# Patient Record
Sex: MALE | Race: WHITE | NOT HISPANIC OR LATINO | Employment: FULL TIME | URBAN - METROPOLITAN AREA
[De-identification: names, ages, dates, MRNs, and addresses within clinical notes are randomized per-mention and may not be internally consistent; named-entity substitution may affect disease eponyms.]

---

## 2017-02-15 RX ORDER — ESCITALOPRAM OXALATE 20 MG/1
TABLET ORAL
Qty: 30 TABLET | Refills: 3 | Status: SHIPPED | OUTPATIENT
Start: 2017-02-15 | End: 2017-11-01 | Stop reason: SDUPTHER

## 2017-05-23 DIAGNOSIS — I10 ESSENTIAL HYPERTENSION: ICD-10-CM

## 2017-05-23 RX ORDER — AMLODIPINE BESYLATE 10 MG/1
TABLET ORAL
Qty: 30 TABLET | Refills: 1 | Status: SHIPPED | OUTPATIENT
Start: 2017-05-23 | End: 2017-07-22 | Stop reason: SDUPTHER

## 2017-05-23 RX ORDER — IRBESARTAN 300 MG/1
TABLET ORAL
Qty: 30 TABLET | Refills: 1 | Status: SHIPPED | OUTPATIENT
Start: 2017-05-23 | End: 2017-08-07 | Stop reason: SDUPTHER

## 2017-07-22 DIAGNOSIS — I10 ESSENTIAL HYPERTENSION: ICD-10-CM

## 2017-07-24 RX ORDER — AMLODIPINE BESYLATE 10 MG/1
TABLET ORAL
Qty: 30 TABLET | Refills: 1 | Status: SHIPPED | OUTPATIENT
Start: 2017-07-24 | End: 2017-10-02 | Stop reason: SDUPTHER

## 2017-08-07 DIAGNOSIS — I10 ESSENTIAL HYPERTENSION: ICD-10-CM

## 2017-08-08 RX ORDER — IRBESARTAN 300 MG/1
TABLET ORAL
Qty: 30 TABLET | Refills: 1 | Status: SHIPPED | OUTPATIENT
Start: 2017-08-08 | End: 2017-10-09 | Stop reason: SDUPTHER

## 2017-10-02 DIAGNOSIS — I10 ESSENTIAL HYPERTENSION: ICD-10-CM

## 2017-10-02 RX ORDER — AMLODIPINE BESYLATE 10 MG/1
TABLET ORAL
Qty: 30 TABLET | Refills: 1 | Status: SHIPPED | OUTPATIENT
Start: 2017-10-02 | End: 2017-11-01 | Stop reason: SDUPTHER

## 2017-10-09 DIAGNOSIS — I10 ESSENTIAL HYPERTENSION: ICD-10-CM

## 2017-10-09 RX ORDER — IRBESARTAN 300 MG/1
TABLET ORAL
Qty: 30 TABLET | Refills: 1 | Status: SHIPPED | OUTPATIENT
Start: 2017-10-09 | End: 2017-11-01 | Stop reason: SDUPTHER

## 2017-11-01 ENCOUNTER — OFFICE VISIT (OUTPATIENT)
Dept: FAMILY MEDICINE CLINIC | Facility: CLINIC | Age: 57
End: 2017-11-01

## 2017-11-01 VITALS
TEMPERATURE: 98.4 F | HEART RATE: 68 BPM | SYSTOLIC BLOOD PRESSURE: 128 MMHG | BODY MASS INDEX: 26.36 KG/M2 | WEIGHT: 234 LBS | RESPIRATION RATE: 16 BRPM | DIASTOLIC BLOOD PRESSURE: 70 MMHG

## 2017-11-01 DIAGNOSIS — Z23 IMMUNIZATION DUE: ICD-10-CM

## 2017-11-01 DIAGNOSIS — I10 ESSENTIAL HYPERTENSION: Primary | ICD-10-CM

## 2017-11-01 DIAGNOSIS — F41.9 ANXIETY: ICD-10-CM

## 2017-11-01 LAB
ANION GAP SERPL CALCULATED.3IONS-SCNC: 7 MMOL/L (ref 3–11)
BUN BLD-MCNC: 14 MG/DL (ref 9–23)
BUN/CREAT SERPL: 14 (ref 7–25)
CALCIUM SPEC-SCNC: 9.3 MG/DL (ref 8.7–10.4)
CHLORIDE SERPL-SCNC: 97 MMOL/L (ref 99–109)
CO2 SERPL-SCNC: 29 MMOL/L (ref 20–31)
CREAT BLD-MCNC: 1 MG/DL (ref 0.6–1.3)
GFR SERPL CREATININE-BSD FRML MDRD: 76 ML/MIN/1.73
GLUCOSE BLD-MCNC: 86 MG/DL (ref 70–100)
POTASSIUM BLD-SCNC: 4.4 MMOL/L (ref 3.5–5.5)
SODIUM BLD-SCNC: 133 MMOL/L (ref 132–146)

## 2017-11-01 PROCEDURE — 90471 IMMUNIZATION ADMIN: CPT | Performed by: FAMILY MEDICINE

## 2017-11-01 PROCEDURE — 99213 OFFICE O/P EST LOW 20 MIN: CPT | Performed by: FAMILY MEDICINE

## 2017-11-01 PROCEDURE — 80048 BASIC METABOLIC PNL TOTAL CA: CPT | Performed by: FAMILY MEDICINE

## 2017-11-01 PROCEDURE — 90715 TDAP VACCINE 7 YRS/> IM: CPT | Performed by: FAMILY MEDICINE

## 2017-11-01 PROCEDURE — 90686 IIV4 VACC NO PRSV 0.5 ML IM: CPT | Performed by: FAMILY MEDICINE

## 2017-11-01 PROCEDURE — 36415 COLL VENOUS BLD VENIPUNCTURE: CPT | Performed by: FAMILY MEDICINE

## 2017-11-01 PROCEDURE — 90472 IMMUNIZATION ADMIN EACH ADD: CPT | Performed by: FAMILY MEDICINE

## 2017-11-01 RX ORDER — AMLODIPINE BESYLATE 10 MG/1
10 TABLET ORAL DAILY
Qty: 30 TABLET | Refills: 6 | Status: SHIPPED | OUTPATIENT
Start: 2017-11-01 | End: 2018-06-26 | Stop reason: SDUPTHER

## 2017-11-01 RX ORDER — ESCITALOPRAM OXALATE 20 MG/1
20 TABLET ORAL DAILY
Qty: 30 TABLET | Refills: 6 | Status: SHIPPED | OUTPATIENT
Start: 2017-11-01 | End: 2018-09-01 | Stop reason: SDUPTHER

## 2017-11-01 RX ORDER — IRBESARTAN 300 MG/1
300 TABLET ORAL DAILY
Qty: 30 TABLET | Refills: 6 | Status: SHIPPED | OUTPATIENT
Start: 2017-11-01 | End: 2018-06-26 | Stop reason: SDUPTHER

## 2017-11-01 NOTE — PROGRESS NOTES
Subjective   Ramy Wan is a 57 y.o. male.     History of Present Illness   Needs medication renewal. No swelling or dizzy moments.  No chest discomfort. Still enjoys working. Exercises and gets right lateral upper hip muscle soreness.   The following portions of the patient's history were reviewed and updated as appropriate: allergies, current medications, past family history, past medical history, past social history, past surgical history and problem list.    Review of Systems   Constitutional: Negative.    Respiratory: Negative.    Cardiovascular: Negative.        Objective   Physical Exam   Constitutional: He appears well-developed. No distress.   Neck: Neck supple.   Pulmonary/Chest: Effort normal.   Musculoskeletal: He exhibits no edema.   Neurological: He is alert.   Vitals reviewed.      Assessment/Plan   Ramy was seen today for med refill.    Diagnoses and all orders for this visit:    Essential hypertension  -     Basic Metabolic Panel  -     amLODIPine (NORVASC) 10 MG tablet; Take 1 tablet by mouth Daily.  -     irbesartan (AVAPRO) 300 MG tablet; Take 1 tablet by mouth Daily.    Anxiety  -     escitalopram (LEXAPRO) 20 MG tablet; Take 1 tablet by mouth Daily.    Immunization due  -     Tdap Vaccine Greater Than or Equal To 8yo IM      Needs Tdap booster and flu shot.

## 2018-04-13 ENCOUNTER — OFFICE VISIT (OUTPATIENT)
Dept: FAMILY MEDICINE CLINIC | Facility: CLINIC | Age: 58
End: 2018-04-13

## 2018-04-13 VITALS
HEART RATE: 62 BPM | OXYGEN SATURATION: 97 % | TEMPERATURE: 98.3 F | RESPIRATION RATE: 16 BRPM | WEIGHT: 240 LBS | DIASTOLIC BLOOD PRESSURE: 60 MMHG | SYSTOLIC BLOOD PRESSURE: 120 MMHG | BODY MASS INDEX: 27.73 KG/M2

## 2018-04-13 DIAGNOSIS — J06.9 ACUTE URI: Primary | ICD-10-CM

## 2018-04-13 LAB
BASOPHILS # BLD AUTO: 0.08 10*3/MM3 (ref 0–0.2)
BASOPHILS NFR BLD AUTO: 1.1 % (ref 0–1)
DEPRECATED RDW RBC AUTO: 43 FL (ref 37–54)
EOSINOPHIL # BLD AUTO: 0.16 10*3/MM3 (ref 0–0.3)
EOSINOPHIL NFR BLD AUTO: 2.2 % (ref 0–3)
ERYTHROCYTE [DISTWIDTH] IN BLOOD BY AUTOMATED COUNT: 13.6 % (ref 11.3–14.5)
HCT VFR BLD AUTO: 38.9 % (ref 38.9–50.9)
HGB BLD-MCNC: 12.7 G/DL (ref 13.1–17.5)
IMM GRANULOCYTES # BLD: 0.01 10*3/MM3 (ref 0–0.03)
IMM GRANULOCYTES NFR BLD: 0.1 % (ref 0–0.6)
LYMPHOCYTES # BLD AUTO: 2.68 10*3/MM3 (ref 0.6–4.8)
LYMPHOCYTES NFR BLD AUTO: 37.4 % (ref 24–44)
MCH RBC QN AUTO: 28.1 PG (ref 27–31)
MCHC RBC AUTO-ENTMCNC: 32.6 G/DL (ref 32–36)
MCV RBC AUTO: 86.1 FL (ref 80–99)
MONOCYTES # BLD AUTO: 0.81 10*3/MM3 (ref 0–1)
MONOCYTES NFR BLD AUTO: 11.3 % (ref 0–12)
NEUTROPHILS # BLD AUTO: 3.42 10*3/MM3 (ref 1.5–8.3)
NEUTROPHILS NFR BLD AUTO: 47.9 % (ref 41–71)
PLATELET # BLD AUTO: 319 10*3/MM3 (ref 150–450)
PMV BLD AUTO: 10.5 FL (ref 6–12)
RBC # BLD AUTO: 4.52 10*6/MM3 (ref 4.2–5.76)
WBC NRBC COR # BLD: 7.16 10*3/MM3 (ref 3.5–10.8)

## 2018-04-13 PROCEDURE — 99213 OFFICE O/P EST LOW 20 MIN: CPT | Performed by: FAMILY MEDICINE

## 2018-04-13 PROCEDURE — 36415 COLL VENOUS BLD VENIPUNCTURE: CPT | Performed by: FAMILY MEDICINE

## 2018-04-13 PROCEDURE — 85025 COMPLETE CBC W/AUTO DIFF WBC: CPT | Performed by: FAMILY MEDICINE

## 2018-04-13 RX ORDER — SULFAMETHOXAZOLE AND TRIMETHOPRIM 800; 160 MG/1; MG/1
1 TABLET ORAL 2 TIMES DAILY
Qty: 14 TABLET | Refills: 0 | Status: SHIPPED | OUTPATIENT
Start: 2018-04-13 | End: 2018-06-20

## 2018-04-13 NOTE — PROGRESS NOTES
Subjective   Ramy Wan is a 57 y.o. male.     History of Present Illness   Pneumonia one year ago, felt bad.  Three months ago chest congestion with sputum, seen [facility] treated with Biaxin ten days, continues morning congestion, wheezing and feels SOB.  No fever or chill.  Energy good.  Dyspnea feeling with steps.  Morning yellow sputum. No chest pain. Tried Allegra-D.   The following portions of the patient's history were reviewed and updated as appropriate: allergies, current medications, past family history, past medical history, past social history, past surgical history and problem list.    Review of Systems   Constitutional: Negative for chills, fatigue and fever.   HENT: Positive for congestion. Negative for sinus pressure and sore throat.    Respiratory: Positive for shortness of breath and wheezing. Negative for cough and chest tightness.    Cardiovascular: Negative for chest pain.       Objective   Physical Exam   Constitutional: He appears well-developed.   HENT:   Mouth/Throat: Oropharynx is clear and moist.   Neck: Neck supple.   Pulmonary/Chest: Breath sounds normal. He has no wheezes.   Lymphadenopathy:     He has no cervical adenopathy.   Vitals reviewed.      Assessment/Plan   Ramy was seen today for uri.    Diagnoses and all orders for this visit:    Acute URI  -     Tiotropium Bromide Monohydrate (SPIRIVA RESPIMAT) 1.25 MCG/ACT aerosol solution inhaler; Inhale 2 puffs Daily.  -     sulfamethoxazole-trimethoprim (BACTRIM DS,SEPTRA DS) 800-160 MG per tablet; Take 1 tablet by mouth 2 (Two) Times a Day.  -     CBC & Differential  -     Cold Agglutinin Screen

## 2018-06-20 PROCEDURE — 87205 SMEAR GRAM STAIN: CPT | Performed by: PHYSICIAN ASSISTANT

## 2018-06-20 PROCEDURE — 87070 CULTURE OTHR SPECIMN AEROBIC: CPT | Performed by: PHYSICIAN ASSISTANT

## 2018-06-22 ENCOUNTER — TELEPHONE (OUTPATIENT)
Dept: URGENT CARE | Facility: CLINIC | Age: 58
End: 2018-06-22

## 2018-06-22 NOTE — TELEPHONE ENCOUNTER
Pt returned call, gave him results of sputum culture and change in antibiotic, he VU.  Also discussed that pulmonary group will follow up with him for appointment.

## 2018-06-23 ENCOUNTER — TELEPHONE (OUTPATIENT)
Dept: URGENT CARE | Facility: CLINIC | Age: 58
End: 2018-06-23

## 2018-06-26 DIAGNOSIS — I10 ESSENTIAL HYPERTENSION: ICD-10-CM

## 2018-06-26 RX ORDER — AMLODIPINE BESYLATE 10 MG/1
TABLET ORAL
Qty: 30 TABLET | Refills: 1 | Status: SHIPPED | OUTPATIENT
Start: 2018-06-26 | End: 2018-09-01 | Stop reason: SDUPTHER

## 2018-06-26 RX ORDER — IRBESARTAN 300 MG/1
TABLET ORAL
Qty: 30 TABLET | Refills: 1 | Status: SHIPPED | OUTPATIENT
Start: 2018-06-26 | End: 2018-09-01 | Stop reason: SDUPTHER

## 2018-09-01 DIAGNOSIS — I10 ESSENTIAL HYPERTENSION: ICD-10-CM

## 2018-09-01 DIAGNOSIS — F41.9 ANXIETY: ICD-10-CM

## 2018-09-01 RX ORDER — ESCITALOPRAM OXALATE 20 MG/1
TABLET ORAL
Qty: 30 TABLET | Refills: 1 | Status: SHIPPED | OUTPATIENT
Start: 2018-09-01 | End: 2018-11-06 | Stop reason: SDUPTHER

## 2018-09-01 RX ORDER — AMLODIPINE BESYLATE 10 MG/1
TABLET ORAL
Qty: 30 TABLET | Refills: 1 | Status: SHIPPED | OUTPATIENT
Start: 2018-09-01 | End: 2018-11-06 | Stop reason: SDUPTHER

## 2018-09-01 RX ORDER — IRBESARTAN 300 MG/1
TABLET ORAL
Qty: 30 TABLET | Refills: 1 | Status: SHIPPED | OUTPATIENT
Start: 2018-09-01 | End: 2018-11-06 | Stop reason: SDUPTHER

## 2018-10-11 ENCOUNTER — FLU SHOT (OUTPATIENT)
Dept: FAMILY MEDICINE CLINIC | Facility: CLINIC | Age: 58
End: 2018-10-11

## 2018-10-11 DIAGNOSIS — Z23 NEED FOR INFLUENZA VACCINATION: ICD-10-CM

## 2018-10-11 PROCEDURE — 90471 IMMUNIZATION ADMIN: CPT | Performed by: FAMILY MEDICINE

## 2018-10-11 PROCEDURE — 90686 IIV4 VACC NO PRSV 0.5 ML IM: CPT | Performed by: FAMILY MEDICINE

## 2018-11-06 DIAGNOSIS — F41.9 ANXIETY: ICD-10-CM

## 2018-11-06 DIAGNOSIS — I10 ESSENTIAL HYPERTENSION: ICD-10-CM

## 2018-11-07 RX ORDER — ESCITALOPRAM OXALATE 20 MG/1
TABLET ORAL
Qty: 30 TABLET | Refills: 0 | Status: SHIPPED | OUTPATIENT
Start: 2018-11-07

## 2018-11-07 RX ORDER — IRBESARTAN 300 MG/1
TABLET ORAL
Qty: 30 TABLET | Refills: 0 | Status: SHIPPED | OUTPATIENT
Start: 2018-11-07 | End: 2020-11-20

## 2018-11-07 RX ORDER — AMLODIPINE BESYLATE 10 MG/1
TABLET ORAL
Qty: 30 TABLET | Refills: 0 | Status: SHIPPED | OUTPATIENT
Start: 2018-11-07

## 2019-02-05 PROBLEM — L20.9 ATOPIC DERMATITIS: Status: ACTIVE | Noted: 2019-02-05

## 2019-02-05 PROBLEM — L29.9 ITCHING: Status: ACTIVE | Noted: 2019-02-05

## 2019-02-07 ENCOUNTER — TELEPHONE (OUTPATIENT)
Dept: URGENT CARE | Facility: CLINIC | Age: 59
End: 2019-02-07

## 2019-02-15 ENCOUNTER — OFFICE VISIT (OUTPATIENT)
Dept: FAMILY MEDICINE CLINIC | Facility: CLINIC | Age: 59
End: 2019-02-15

## 2019-02-15 VITALS
HEART RATE: 60 BPM | OXYGEN SATURATION: 98 % | DIASTOLIC BLOOD PRESSURE: 76 MMHG | SYSTOLIC BLOOD PRESSURE: 130 MMHG | BODY MASS INDEX: 27.71 KG/M2 | RESPIRATION RATE: 18 BRPM | WEIGHT: 239.5 LBS | TEMPERATURE: 98.2 F | HEIGHT: 78 IN

## 2019-02-15 DIAGNOSIS — B86 SCABIES INFESTATION: Primary | ICD-10-CM

## 2019-02-15 PROCEDURE — 99213 OFFICE O/P EST LOW 20 MIN: CPT | Performed by: NURSE PRACTITIONER

## 2019-02-15 RX ORDER — IVERMECTIN 3 MG/1
200 TABLET ORAL ONCE
Qty: 7.5 TABLET | Refills: 0 | Status: SHIPPED | OUTPATIENT
Start: 2019-02-15 | End: 2019-02-15

## 2019-02-15 RX ORDER — PERMETHRIN 50 MG/G
CREAM TOPICAL ONCE
Qty: 1 EACH | Refills: 1 | Status: SHIPPED | OUTPATIENT
Start: 2019-02-15 | End: 2019-02-15

## 2019-02-18 NOTE — PROGRESS NOTES
"Joshua Wan is a 58 y.o. male.   Chief Complaint   Patient presents with   • Rash     1 month    walk in   History of Present Illness   Patient is here with complaint of scabies. He thinks he has been exposed at a nursing home. He has tried some over the counter medications: it is very itchy.       The following portions of the patient's history were reviewed and updated as appropriate: allergies, current medications, past family history, past medical history, past social history, past surgical history and problem list.    Review of Systems   Constitutional: Negative.    HENT: Negative.    Respiratory: Negative.    Cardiovascular: Negative.    Gastrointestinal: Negative.    Musculoskeletal: Negative.    Skin: Positive for color change and rash.        Scratching arms.   Has sites between fingers and in bends of arms.      Allergic/Immunologic: Negative.    Neurological: Negative.    Hematological: Negative.    Psychiatric/Behavioral: Negative.      Past Surgical History:   Procedure Laterality Date   • CATARACT EXTRACTION       Past Medical History:   Diagnosis Date   • Acute pharyngitis        Objective   No Known Allergies  Visit Vitals  /76 (BP Location: Right arm, Patient Position: Sitting, Cuff Size: Adult)   Pulse 60   Temp 98.2 °F (36.8 °C) (Temporal)   Resp 18   Ht 200.7 cm (79\")   Wt 109 kg (239 lb 8 oz)   SpO2 98%   BMI 26.98 kg/m²       Physical Exam   Constitutional: He is oriented to person, place, and time. He appears well-developed and well-nourished.   Cardiovascular: Normal rate and regular rhythm.   Pulmonary/Chest: Effort normal and breath sounds normal.   Neurological: He is alert and oriented to person, place, and time.   Skin: Skin is warm and dry. Capillary refill takes less than 2 seconds. Rash noted. There is erythema.   Patient has small scabs between fingers and at the forearm at the bend of elbows. Patient has large scratch marks consistent with self inflicted " scratches.        Psychiatric: He has a normal mood and affect. His behavior is normal.   Vitals reviewed.      Assessment/Plan   Ramy was seen today for rash.    Diagnoses and all orders for this visit:    Scabies infestation  -     permethrin (ELIMITE) 5 % cream; Apply  topically to the appropriate area as directed 1 (One) Time for 1 dose.  -     ivermectin (STROMECTOL) 3 MG tablet tablet; Take 7.5 tablets by mouth 1 (One) Time for 1 dose.          See scabies and permethrin lotion patient instructions   Follow up as needed.          Patient Instructions   Permethrin lotion  What is this medicine?  PERMETHRIN (per METH rin) is used to treat head lice infestations. It acts by destroying both the lice and their eggs.  This medicine may be used for other purposes; ask your health care provider or pharmacist if you have questions.  COMMON BRAND NAME(S): Nix Complete Lice Elimination Kit, Nix Lice Killing Creme Rinse  What should I tell my health care provider before I take this medicine?  They need to know if you have any of these conditions:  -asthma  -an unusual or allergic reaction to permethrin, veterinary or household insecticides, other medicines, chrysanthemums, foods, dyes, or preservatives  -pregnant or trying to get pregnant  -breast-feeding  How should I use this medicine?  This medicine is for external use only. Do not take by mouth. Follow the directions on the product label. Do not wash hair with conditioner or a combination shampoo-conditioner immediately before applying this medicine. Follow product-specific instructions for length of application and product removal. All products should be rinsed from the hair over a sink or tub to limit skin exposure; do not use hot water. In general, do not re-wash the hair for 1 to 2 days after use.  Talk to your pediatrician regarding the use of this medicine in children. While this drug may be prescribed for children as young as 2 months old for selected  conditions, precautions do apply.  Overdosage: If you think you have taken too much of this medicine contact a poison control center or emergency room at once.  NOTE: This medicine is only for you. Do not share this medicine with others.  What if I miss a dose?  This does not apply.  What may interact with this medicine?  Interactions are not expected. Do not use any other skin products on the affected area without telling your doctor or health care professional.  This list may not describe all possible interactions. Give your health care provider a list of all the medicines, herbs, non-prescription drugs, or dietary supplements you use. Also tell them if you smoke, drink alcohol, or use illegal drugs. Some items may interact with your medicine.  What should I watch for while using this medicine?  This medicine is used as a single application treatment. If live lice are observed 7 or more days after initial application, a second treatment may be needed.  Head lice can be spread from one person to another by direct contact with clothing, hats, scarves, bedding, towels, washcloths, hairbrushes, and green. All members of your household should be examined for head lice and should receive treatment if they are found to be infected. If you have any questions about this, check with your doctor or health care professional.  To prevent reinfection or spreading of the infection, the following steps should be taken: Machine wash all clothing, bedding, towels, and washcloths in very hot water and dry them using the hot cycle of a dryer for at least 20 minutes. Clothing or bedding that cannot be washed should be dry cleaned or sealed in an airtight plastic bag for 2 weeks. Shampoo any wigs or hairpieces. You should also wash all hairbrushes and green in very hot soapy water (above 130 degrees F) for 5 to 10 minutes. Do not share your hairbrushes or green with other people. Wash all toys in very hot water (above 130 degrees F)  for 5 to 10 minutes or seal in an airtight plastic bag for 2 weeks. Also, clean the house or room by vacuuming furniture, rugs, and floors.  What side effects may I notice from receiving this medicine?  Side effects that usually do not require medical attention (report to your doctor or health care professional if they continue or are bothersome):  -itching  -redness or mild swelling of the scalp  -stinging or burning  -tingling sensation  This list may not describe all possible side effects. Call your doctor for medical advice about side effects. You may report side effects to FDA at 7-801-LNZ-5054.  Where should I keep my medicine?  Keep out of the reach of children.  Store at room temperature away from heat and direct light. Do not refrigerate or freeze. After treatment, throw away any unused medicine.  NOTE: This sheet is a summary. It may not cover all possible information. If you have questions about this medicine, talk to your doctor, pharmacist, or health care provider.  © 2018 [employer]vier[employer] Standard (2017-07-14 11:51:24)  Ivermectin tablets  What is this medicine?  IVERMECTIN (eye martin MEK tin) is an anti-infective. It is used to treat infections of some parasites.  This medicine may be used for other purposes; ask your health care provider or pharmacist if you have questions.  COMMON BRAND NAME(S): Stromectol  What should I tell my health care provider before I take this medicine?  They need to know if you have any of these conditions:  -asthma  -liver disease  -an unusual or allergic reaction to ivermectin, other medicines, foods, dyes, or preservatives  -pregnant or trying to get pregnant  -breast-feeding  How should I use this medicine?  Take this medicine by mouth with a full glass of water. Follow the directions on the prescription label. Take this medicine on an empty stomach, at least 30 minutes before or 2 hours after food. Do not take with food. Take your medicine at regular intervals. Do not take  your medicine more often than directed. Take all of your medicine as directed even if you think you are better. Do not skip doses or stop your medicine early.  Talk to your pediatrician regarding the use of this medicine in children. Special care may be needed.  Overdosage: If you think you have taken too much of this medicine contact a poison control center or emergency room at once.  NOTE: This medicine is only for you. Do not share this medicine with others.  What if I miss a dose?  If you miss a dose, take it as soon as you can. If it is almost time for your next dose, take only that dose. Do not take double or extra doses.  What may interact with this medicine?  -medicines that treat or prevent blood clots like warfarin  This list may not describe all possible interactions. Give your health care provider a list of all the medicines, herbs, non-prescription drugs, or dietary supplements you use. Also tell them if you smoke, drink alcohol, or use illegal drugs. Some items may interact with your medicine.  What should I watch for while using this medicine?  See your doctor or health care professional for a follow-up visit as directed. You will need to have tests done to check that the infection is cleared. You may need retreatment. Tell your doctor if your symptoms do not improve or if they get worse.  Practice good hygiene to prevent infection of others. Wash your hands, scrub your fingernails and shower often. Every day change and launder linens and undergarments. Scrub toilets often and keep floors clean.  What side effects may I notice from receiving this medicine?  Side effects that you should report to your doctor or health care professional as soon as possible:  -allergic reactions like skin rash, itching or hives, swelling of the face, lips, or tongue  -breathing problems  -changes in vision  -chest pain  -confusion  -eye pain, swelling, redness  -fast, irregular heartrate  -feeling dizzy,  faint  -fever  -redness, blistering, peeling or loosening of the skin, including inside the mouth  -seizures  -uncontrolled urination, bowel movements  -unusual swelling  -unusually weak or tired  Side effects that usually do not require medical attention (report to your doctor or health care professional if they continue or are bothersome):  -constipation, diarrhea  -headache  -joint or muscle pain  -loss of appetite  -nausea, vomiting  -stomach pain  -tender glands in the neck, armpits, or groin  -tremor  This list may not describe all possible side effects. Call your doctor for medical advice about side effects. You may report side effects to FDA at 7-373-ERW-7602.  Where should I keep my medicine?  Keep out of the reach of children.  Store at room temperature below 30 degrees C (86 degrees F). Keep container tightly closed. Throw away any unused medicine after the expiration date.  NOTE: This sheet is a summary. It may not cover all possible information. If you have questions about this medicine, talk to your doctor, pharmacist, or health care provider.  © 2018 Elsevier/Gold Standard (2009-05-07 13:18:45)  Scabies, Adult  Scabies is a skin condition that happens when very small insects get under the skin (infestation). This causes a rash and severe itchiness. Scabies can spread from person to person (is contagious). If you get scabies, it is common for others in your household to get scabies too.  With proper treatment, symptoms usually go away in 2-4 weeks. Scabies usually does not cause lasting problems.  What are the causes?  This condition is caused by mites (Sarcoptes scabiei, or human itch mites) that can only be seen with a microscope. The mites get into the top layer of skin and lay eggs. Scabies can spread from person to person through:  · Close contact with a person who has scabies.  · Contact with infested items, such as towels, bedding, or clothing.    What increases the risk?  This condition is more  likely to develop in:  · People who live in nursing homes and other extended-care facilities.  · People who have sexual contact with a partner who has scabies.  · Young children who attend  facilities.  · People who care for others who are at increased risk for scabies.    What are the signs or symptoms?  Symptoms of this condition may include:  · Severe itchiness. This is often worse at night.  · A rash that includes tiny red bumps or blisters. The rash commonly occurs on the wrist, elbow, armpit, fingers, waist, groin, or buttocks. Bumps may form a line (burrow) in some areas.  · Skin irritation. This can include scaly patches or sores.    How is this diagnosed?  This condition is diagnosed with a physical exam. Your health care provider will look closely at your skin. In some cases, your health care provider may take a sample of your affected skin (skin scraping) and have it examined under a microscope.  How is this treated?  This condition may be treated with:  · Medicated cream or lotion that kills the mites. This is spread on the entire body and left on for several hours. Usually, one treatment with medicated cream or lotion is enough to kill all of the mites. In severe cases, the treatment may be repeated.  · Medicated cream that relieves itching.  · Medicines that help to relieve itching.  · Medicines that kill the mites. This treatment is rarely used.    Follow these instructions at home:    Medicines  · Take or apply over-the-counter and prescription medicines as told by your health care provider.  · Apply medicated cream or lotion as told by your health care provider.  · Do not wash off the medicated cream or lotion until the necessary amount of time has passed.  Skin Care  · Avoid scratching your affected skin.  · Keep your fingernails closely trimmed to reduce injury from scratching.  · Take cool baths or apply cool washcloths to help reduce itching.  General instructions  · Clean all items  that you recently had contact with, including bedding, clothing, and furniture. Do this on the same day that your treatment starts.  ? Use hot water when you wash items.  ? Place unwashable items into closed, airtight plastic bags for at least 3 days. The mites cannot live for more than 3 days away from human skin.  ? Vacuum furniture and mattresses that you use.  · Make sure that other people who may have been infested are examined by a health care provider. These include members of your household and anyone who may have had contact with infested items.  · Keep all follow-up visits as told by your health care provider. This is important.  Contact a health care provider if:  · You have itching that does not go away after 4 weeks of treatment.  · You continue to develop new bumps or burrows.  · You have redness, swelling, or pain in your rash area after treatment.  · You have fluid, blood, or pus coming from your rash.  This information is not intended to replace advice given to you by your health care provider. Make sure you discuss any questions you have with your health care provider.  Document Released: 09/07/2016 Document Revised: 05/25/2017 Document Reviewed: 07/19/2016  [employer] Interactive Patient Education © 2018 [employer]        URIEL Pablo

## 2019-03-04 ENCOUNTER — TRANSCRIBE ORDERS (OUTPATIENT)
Dept: LAB | Facility: HOSPITAL | Age: 59
End: 2019-03-04

## 2019-03-04 ENCOUNTER — TRANSCRIBE ORDERS (OUTPATIENT)
Dept: ADMINISTRATIVE | Facility: HOSPITAL | Age: 59
End: 2019-03-04

## 2019-03-04 ENCOUNTER — LAB (OUTPATIENT)
Dept: LAB | Facility: HOSPITAL | Age: 59
End: 2019-03-04

## 2019-03-04 ENCOUNTER — HOSPITAL ENCOUNTER (OUTPATIENT)
Dept: GENERAL RADIOLOGY | Facility: HOSPITAL | Age: 59
Discharge: HOME | End: 2019-03-04
Admitting: DERMATOLOGY

## 2019-03-04 DIAGNOSIS — L50.0 ALLERGIC URTICARIA: Primary | ICD-10-CM

## 2019-03-04 DIAGNOSIS — L50.0 ALLERGIC URTICARIA: ICD-10-CM

## 2019-03-04 DIAGNOSIS — L50.9 URTICARIA, UNSPECIFIED: Primary | ICD-10-CM

## 2019-03-04 LAB
ALBUMIN SERPL-MCNC: 4.61 G/DL (ref 3.2–4.8)
ALBUMIN/GLOB SERPL: 1.8 G/DL (ref 1.5–2.5)
ALP SERPL-CCNC: 60 U/L (ref 25–100)
ALT SERPL W P-5'-P-CCNC: 20 U/L (ref 7–40)
ANION GAP SERPL CALCULATED.3IONS-SCNC: 12 MMOL/L (ref 3–11)
AST SERPL-CCNC: 21 U/L (ref 0–33)
BASOPHILS # BLD AUTO: 0.03 10*3/MM3 (ref 0–0.2)
BASOPHILS NFR BLD AUTO: 0.6 % (ref 0–1)
BILIRUB SERPL-MCNC: 0.7 MG/DL (ref 0.3–1.2)
BUN BLD-MCNC: 9 MG/DL (ref 9–23)
BUN/CREAT SERPL: 10.6 (ref 7–25)
CALCIUM SPEC-SCNC: 8.9 MG/DL (ref 8.7–10.4)
CHLORIDE SERPL-SCNC: 100 MMOL/L (ref 99–109)
CO2 SERPL-SCNC: 22 MMOL/L (ref 20–31)
CREAT BLD-MCNC: 0.85 MG/DL (ref 0.6–1.3)
DEPRECATED RDW RBC AUTO: 43.9 FL (ref 37–54)
EOSINOPHIL # BLD AUTO: 0.08 10*3/MM3 (ref 0–0.3)
EOSINOPHIL NFR BLD AUTO: 1.6 % (ref 0–3)
ERYTHROCYTE [DISTWIDTH] IN BLOOD BY AUTOMATED COUNT: 13.3 % (ref 11.3–14.5)
GFR SERPL CREATININE-BSD FRML MDRD: 92 ML/MIN/1.73
GLOBULIN UR ELPH-MCNC: 2.6 GM/DL
GLUCOSE BLD-MCNC: 55 MG/DL (ref 70–100)
HCT VFR BLD AUTO: 42 % (ref 38.9–50.9)
HGB BLD-MCNC: 13.9 G/DL (ref 13.1–17.5)
IMM GRANULOCYTES # BLD AUTO: 0.01 10*3/MM3 (ref 0–0.05)
IMM GRANULOCYTES NFR BLD AUTO: 0.2 % (ref 0–0.6)
LYMPHOCYTES # BLD AUTO: 2.03 10*3/MM3 (ref 0.6–4.8)
LYMPHOCYTES NFR BLD AUTO: 40.4 % (ref 24–44)
MCH RBC QN AUTO: 30 PG (ref 27–31)
MCHC RBC AUTO-ENTMCNC: 33.1 G/DL (ref 32–36)
MCV RBC AUTO: 90.5 FL (ref 80–99)
MONOCYTES # BLD AUTO: 0.71 10*3/MM3 (ref 0–1)
MONOCYTES NFR BLD AUTO: 14.1 % (ref 0–12)
NEUTROPHILS # BLD AUTO: 2.18 10*3/MM3 (ref 1.5–8.3)
NEUTROPHILS NFR BLD AUTO: 43.3 % (ref 41–71)
NRBC BLD AUTO-RTO: 0 /100 WBC (ref 0–0)
PLATELET # BLD AUTO: 327 10*3/MM3 (ref 150–450)
PMV BLD AUTO: 10.3 FL (ref 6–12)
POTASSIUM BLD-SCNC: 4.4 MMOL/L (ref 3.5–5.5)
PROT SERPL-MCNC: 7.2 G/DL (ref 5.7–8.2)
RBC # BLD AUTO: 4.64 10*6/MM3 (ref 4.2–5.76)
SODIUM BLD-SCNC: 134 MMOL/L (ref 132–146)
WBC NRBC COR # BLD: 5.03 10*3/MM3 (ref 3.5–10.8)

## 2019-03-04 PROCEDURE — 71046 X-RAY EXAM CHEST 2 VIEWS: CPT

## 2019-03-04 PROCEDURE — 80053 COMPREHEN METABOLIC PANEL: CPT | Performed by: DERMATOLOGY

## 2019-03-04 PROCEDURE — 85025 COMPLETE CBC W/AUTO DIFF WBC: CPT

## 2019-03-04 PROCEDURE — 36415 COLL VENOUS BLD VENIPUNCTURE: CPT | Performed by: DERMATOLOGY

## 2020-03-23 ENCOUNTER — TRANSCRIBE ORDERS (OUTPATIENT)
Dept: ADMINISTRATIVE | Facility: HOSPITAL | Age: 60
End: 2020-03-23

## 2020-03-23 DIAGNOSIS — R59.9 SWOLLEN LYMPH NODES: Primary | ICD-10-CM

## 2020-04-02 ENCOUNTER — TRANSCRIBE ORDERS (OUTPATIENT)
Dept: ADMINISTRATIVE | Facility: HOSPITAL | Age: 60
End: 2020-04-02

## 2020-04-02 DIAGNOSIS — R22.1 NECK MASS: Primary | ICD-10-CM

## 2020-04-06 ENCOUNTER — HOSPITAL ENCOUNTER (OUTPATIENT)
Dept: CT IMAGING | Facility: HOSPITAL | Age: 60
Discharge: HOME | End: 2020-04-06
Admitting: STUDENT IN AN ORGANIZED HEALTH CARE EDUCATION/TRAINING PROGRAM

## 2020-04-06 DIAGNOSIS — R22.1 NECK MASS: ICD-10-CM

## 2020-04-06 LAB — CREAT BLDA-MCNC: 1 MG/DL (ref 0.6–1.3)

## 2020-04-06 PROCEDURE — 82565 ASSAY OF CREATININE: CPT

## 2020-04-06 PROCEDURE — 70491 CT SOFT TISSUE NECK W/DYE: CPT

## 2020-04-06 PROCEDURE — 25010000002 IOPAMIDOL 61 % SOLUTION: Performed by: STUDENT IN AN ORGANIZED HEALTH CARE EDUCATION/TRAINING PROGRAM

## 2020-04-06 RX ADMIN — IOPAMIDOL 80 ML: 612 INJECTION, SOLUTION INTRAVENOUS at 09:48

## 2020-04-17 ENCOUNTER — TRANSCRIBE ORDERS (OUTPATIENT)
Dept: LAB | Facility: HOSPITAL | Age: 60
End: 2020-04-17

## 2020-04-17 ENCOUNTER — HOSPITAL ENCOUNTER (OUTPATIENT)
Dept: CARDIOLOGY | Facility: HOSPITAL | Age: 60
Discharge: HOME | End: 2020-04-17
Admitting: OTOLARYNGOLOGY

## 2020-04-17 ENCOUNTER — LAB (OUTPATIENT)
Dept: LAB | Facility: HOSPITAL | Age: 60
End: 2020-04-17

## 2020-04-17 DIAGNOSIS — Z01.812 PRE-OPERATIVE LABORATORY EXAMINATION: Primary | ICD-10-CM

## 2020-04-17 DIAGNOSIS — I10 ESSENTIAL HYPERTENSION, MALIGNANT: ICD-10-CM

## 2020-04-17 DIAGNOSIS — Z01.812 PRE-OPERATIVE LABORATORY EXAMINATION: ICD-10-CM

## 2020-04-17 LAB
ANION GAP SERPL CALCULATED.3IONS-SCNC: 15.5 MMOL/L (ref 5–15)
BUN BLD-MCNC: 10 MG/DL (ref 8–23)
BUN/CREAT SERPL: 11.8 (ref 7–25)
CALCIUM SPEC-SCNC: 9.2 MG/DL (ref 8.6–10.5)
CHLORIDE SERPL-SCNC: 97 MMOL/L (ref 98–107)
CO2 SERPL-SCNC: 23.5 MMOL/L (ref 22–29)
CREAT BLD-MCNC: 0.85 MG/DL (ref 0.76–1.27)
DEPRECATED RDW RBC AUTO: 41.5 FL (ref 37–54)
ERYTHROCYTE [DISTWIDTH] IN BLOOD BY AUTOMATED COUNT: 12.7 % (ref 12.3–15.4)
GFR SERPL CREATININE-BSD FRML MDRD: 92 ML/MIN/1.73
GLUCOSE BLD-MCNC: 61 MG/DL (ref 65–99)
HCT VFR BLD AUTO: 41.7 % (ref 37.5–51)
HGB BLD-MCNC: 14.1 G/DL (ref 13–17.7)
MCH RBC QN AUTO: 29.8 PG (ref 26.6–33)
MCHC RBC AUTO-ENTMCNC: 33.8 G/DL (ref 31.5–35.7)
MCV RBC AUTO: 88.2 FL (ref 79–97)
PLATELET # BLD AUTO: 315 10*3/MM3 (ref 140–450)
PMV BLD AUTO: 10.3 FL (ref 6–12)
POTASSIUM BLD-SCNC: 4.8 MMOL/L (ref 3.5–5.2)
RBC # BLD AUTO: 4.73 10*6/MM3 (ref 4.14–5.8)
SODIUM BLD-SCNC: 136 MMOL/L (ref 136–145)
WBC NRBC COR # BLD: 5.56 10*3/MM3 (ref 3.4–10.8)

## 2020-04-17 PROCEDURE — 93005 ELECTROCARDIOGRAM TRACING: CPT | Performed by: OTOLARYNGOLOGY

## 2020-04-17 PROCEDURE — 85027 COMPLETE CBC AUTOMATED: CPT

## 2020-04-17 PROCEDURE — 36415 COLL VENOUS BLD VENIPUNCTURE: CPT

## 2020-04-17 PROCEDURE — 80048 BASIC METABOLIC PNL TOTAL CA: CPT

## 2020-04-17 PROCEDURE — 93010 ELECTROCARDIOGRAM REPORT: CPT | Performed by: INTERNAL MEDICINE

## 2020-04-28 ENCOUNTER — APPOINTMENT (OUTPATIENT)
Dept: ULTRASOUND IMAGING | Facility: HOSPITAL | Age: 60
End: 2020-04-28

## 2020-04-29 ENCOUNTER — ANESTHESIA EVENT (OUTPATIENT)
Dept: PERIOP | Facility: HOSPITAL | Age: 60
End: 2020-04-29
Payer: COMMERCIAL

## 2020-04-29 RX ORDER — SODIUM CHLORIDE 0.9 % (FLUSH) 0.9 %
10 SYRINGE (ML) INJECTION EVERY 12 HOURS SCHEDULED
Status: CANCELLED | OUTPATIENT
Start: 2020-04-29

## 2020-04-29 RX ORDER — SODIUM CHLORIDE 0.9 % (FLUSH) 0.9 %
10 SYRINGE (ML) INJECTION AS NEEDED
Status: CANCELLED | OUTPATIENT
Start: 2020-04-29

## 2020-04-29 RX ORDER — FAMOTIDINE 10 MG/ML
20 INJECTION, SOLUTION INTRAVENOUS ONCE
Status: CANCELLED | OUTPATIENT
Start: 2020-04-29 | End: 2020-04-29

## 2020-04-30 ENCOUNTER — HOSPITAL ENCOUNTER (OUTPATIENT)
Facility: HOSPITAL | Age: 60
Setting detail: SURGERY ADMIT
Discharge: HOME | End: 2020-04-30
Attending: OTOLARYNGOLOGY | Admitting: OTOLARYNGOLOGY

## 2020-04-30 ENCOUNTER — ANESTHESIA (OUTPATIENT)
Dept: PERIOP | Facility: HOSPITAL | Age: 60
End: 2020-04-30
Payer: COMMERCIAL

## 2020-04-30 VITALS
SYSTOLIC BLOOD PRESSURE: 135 MMHG | RESPIRATION RATE: 16 BRPM | TEMPERATURE: 97.9 F | BODY MASS INDEX: 27.19 KG/M2 | WEIGHT: 235 LBS | HEIGHT: 78 IN | DIASTOLIC BLOOD PRESSURE: 67 MMHG | OXYGEN SATURATION: 100 % | HEART RATE: 72 BPM

## 2020-04-30 DIAGNOSIS — C44.42 SQUAMOUS CELL CARCINOMA OF NECK: ICD-10-CM

## 2020-04-30 PROCEDURE — 25010000002 DEXAMETHASONE PER 1 MG: Performed by: NURSE ANESTHETIST, CERTIFIED REGISTERED

## 2020-04-30 PROCEDURE — 88342 IMHCHEM/IMCYTCHM 1ST ANTB: CPT | Performed by: OTOLARYNGOLOGY

## 2020-04-30 PROCEDURE — 25010000003 CEFAZOLIN IN DEXTROSE 2-4 GM/100ML-% SOLUTION: Performed by: OTOLARYNGOLOGY

## 2020-04-30 PROCEDURE — 88304 TISSUE EXAM BY PATHOLOGIST: CPT | Performed by: OTOLARYNGOLOGY

## 2020-04-30 PROCEDURE — 25010000002 PROPOFOL 10 MG/ML EMULSION: Performed by: NURSE ANESTHETIST, CERTIFIED REGISTERED

## 2020-04-30 PROCEDURE — 88341 IMHCHEM/IMCYTCHM EA ADD ANTB: CPT | Performed by: OTOLARYNGOLOGY

## 2020-04-30 PROCEDURE — 88307 TISSUE EXAM BY PATHOLOGIST: CPT | Performed by: OTOLARYNGOLOGY

## 2020-04-30 DEVICE — LIGACLIP MCA MULTIPLE CLIP APPLIERS, 20 SMALL CLIPS
Type: IMPLANTABLE DEVICE | Site: NECK | Status: FUNCTIONAL
Brand: LIGACLIP

## 2020-04-30 RX ORDER — LABETALOL HYDROCHLORIDE 5 MG/ML
5 INJECTION, SOLUTION INTRAVENOUS
Status: DISCONTINUED | OUTPATIENT
Start: 2020-04-30 | End: 2020-04-30 | Stop reason: HOSPADM

## 2020-04-30 RX ORDER — LIDOCAINE HYDROCHLORIDE 10 MG/ML
0.5 INJECTION, SOLUTION EPIDURAL; INFILTRATION; INTRACAUDAL; PERINEURAL ONCE AS NEEDED
Status: COMPLETED | OUTPATIENT
Start: 2020-04-30 | End: 2020-04-30

## 2020-04-30 RX ORDER — ONDANSETRON 2 MG/ML
4 INJECTION INTRAMUSCULAR; INTRAVENOUS ONCE AS NEEDED
Status: DISCONTINUED | OUTPATIENT
Start: 2020-04-30 | End: 2020-04-30 | Stop reason: HOSPADM

## 2020-04-30 RX ORDER — FENTANYL CITRATE 50 UG/ML
50 INJECTION, SOLUTION INTRAMUSCULAR; INTRAVENOUS
Status: DISCONTINUED | OUTPATIENT
Start: 2020-04-30 | End: 2020-04-30 | Stop reason: HOSPADM

## 2020-04-30 RX ORDER — MIDAZOLAM HYDROCHLORIDE 1 MG/ML
2 INJECTION INTRAMUSCULAR; INTRAVENOUS ONCE
Status: DISCONTINUED | OUTPATIENT
Start: 2020-04-30 | End: 2020-04-30 | Stop reason: HOSPADM

## 2020-04-30 RX ORDER — PROMETHAZINE HYDROCHLORIDE 25 MG/ML
6.25 INJECTION, SOLUTION INTRAMUSCULAR; INTRAVENOUS ONCE AS NEEDED
Status: DISCONTINUED | OUTPATIENT
Start: 2020-04-30 | End: 2020-04-30 | Stop reason: HOSPADM

## 2020-04-30 RX ORDER — PROMETHAZINE HYDROCHLORIDE 25 MG/1
25 SUPPOSITORY RECTAL ONCE AS NEEDED
Status: DISCONTINUED | OUTPATIENT
Start: 2020-04-30 | End: 2020-04-30 | Stop reason: HOSPADM

## 2020-04-30 RX ORDER — SODIUM CHLORIDE 0.9 % (FLUSH) 0.9 %
3-10 SYRINGE (ML) INJECTION AS NEEDED
Status: DISCONTINUED | OUTPATIENT
Start: 2020-04-30 | End: 2020-04-30 | Stop reason: HOSPADM

## 2020-04-30 RX ORDER — PROMETHAZINE HYDROCHLORIDE 25 MG/1
25 TABLET ORAL ONCE AS NEEDED
Status: DISCONTINUED | OUTPATIENT
Start: 2020-04-30 | End: 2020-04-30 | Stop reason: HOSPADM

## 2020-04-30 RX ORDER — HYDROMORPHONE HYDROCHLORIDE 1 MG/ML
0.5 INJECTION, SOLUTION INTRAMUSCULAR; INTRAVENOUS; SUBCUTANEOUS
Status: DISCONTINUED | OUTPATIENT
Start: 2020-04-30 | End: 2020-04-30 | Stop reason: HOSPADM

## 2020-04-30 RX ORDER — HYDROCODONE BITARTRATE AND ACETAMINOPHEN 5; 325 MG/1; MG/1
1 TABLET ORAL ONCE AS NEEDED
Status: DISCONTINUED | OUTPATIENT
Start: 2020-04-30 | End: 2020-04-30 | Stop reason: HOSPADM

## 2020-04-30 RX ORDER — DEXAMETHASONE SODIUM PHOSPHATE 4 MG/ML
INJECTION, SOLUTION INTRA-ARTICULAR; INTRALESIONAL; INTRAMUSCULAR; INTRAVENOUS; SOFT TISSUE AS NEEDED
Status: DISCONTINUED | OUTPATIENT
Start: 2020-04-30 | End: 2020-04-30 | Stop reason: SURG

## 2020-04-30 RX ORDER — MAGNESIUM HYDROXIDE 1200 MG/15ML
LIQUID ORAL AS NEEDED
Status: DISCONTINUED | OUTPATIENT
Start: 2020-04-30 | End: 2020-04-30 | Stop reason: HOSPADM

## 2020-04-30 RX ORDER — LIDOCAINE HYDROCHLORIDE 10 MG/ML
INJECTION, SOLUTION EPIDURAL; INFILTRATION; INTRACAUDAL; PERINEURAL AS NEEDED
Status: DISCONTINUED | OUTPATIENT
Start: 2020-04-30 | End: 2020-04-30 | Stop reason: SURG

## 2020-04-30 RX ORDER — CEFAZOLIN SODIUM 2 G/100ML
2 INJECTION, SOLUTION INTRAVENOUS ONCE
Status: COMPLETED | OUTPATIENT
Start: 2020-04-30 | End: 2020-04-30

## 2020-04-30 RX ORDER — FAMOTIDINE 20 MG/1
20 TABLET, FILM COATED ORAL ONCE
Status: COMPLETED | OUTPATIENT
Start: 2020-04-30 | End: 2020-04-30

## 2020-04-30 RX ORDER — NALOXONE HCL 0.4 MG/ML
0.4 VIAL (ML) INJECTION AS NEEDED
Status: DISCONTINUED | OUTPATIENT
Start: 2020-04-30 | End: 2020-04-30 | Stop reason: HOSPADM

## 2020-04-30 RX ORDER — KETAMINE HCL IN NACL, ISO-OSM 100MG/10ML
SYRINGE (ML) INJECTION AS NEEDED
Status: DISCONTINUED | OUTPATIENT
Start: 2020-04-30 | End: 2020-04-30 | Stop reason: SURG

## 2020-04-30 RX ORDER — LIDOCAINE HYDROCHLORIDE AND EPINEPHRINE 10; 10 MG/ML; UG/ML
INJECTION, SOLUTION INFILTRATION; PERINEURAL AS NEEDED
Status: DISCONTINUED | OUTPATIENT
Start: 2020-04-30 | End: 2020-04-30 | Stop reason: HOSPADM

## 2020-04-30 RX ORDER — BUPIVACAINE HYDROCHLORIDE AND EPINEPHRINE 5; 5 MG/ML; UG/ML
INJECTION, SOLUTION EPIDURAL; INTRACAUDAL; PERINEURAL AS NEEDED
Status: DISCONTINUED | OUTPATIENT
Start: 2020-04-30 | End: 2020-04-30 | Stop reason: HOSPADM

## 2020-04-30 RX ORDER — PROPOFOL 10 MG/ML
VIAL (ML) INTRAVENOUS AS NEEDED
Status: DISCONTINUED | OUTPATIENT
Start: 2020-04-30 | End: 2020-04-30 | Stop reason: SURG

## 2020-04-30 RX ORDER — IPRATROPIUM BROMIDE AND ALBUTEROL SULFATE 2.5; .5 MG/3ML; MG/3ML
3 SOLUTION RESPIRATORY (INHALATION) ONCE AS NEEDED
Status: DISCONTINUED | OUTPATIENT
Start: 2020-04-30 | End: 2020-04-30 | Stop reason: HOSPADM

## 2020-04-30 RX ORDER — SODIUM CHLORIDE, SODIUM LACTATE, POTASSIUM CHLORIDE, CALCIUM CHLORIDE 600; 310; 30; 20 MG/100ML; MG/100ML; MG/100ML; MG/100ML
9 INJECTION, SOLUTION INTRAVENOUS CONTINUOUS
Status: DISCONTINUED | OUTPATIENT
Start: 2020-04-30 | End: 2020-04-30 | Stop reason: HOSPADM

## 2020-04-30 RX ORDER — SODIUM CHLORIDE 0.9 % (FLUSH) 0.9 %
3 SYRINGE (ML) INJECTION EVERY 12 HOURS SCHEDULED
Status: DISCONTINUED | OUTPATIENT
Start: 2020-04-30 | End: 2020-04-30 | Stop reason: HOSPADM

## 2020-04-30 RX ORDER — ATORVASTATIN CALCIUM 20 MG/1
20 TABLET, FILM COATED ORAL NIGHTLY
COMMUNITY

## 2020-04-30 RX ORDER — HYDRALAZINE HYDROCHLORIDE 20 MG/ML
5 INJECTION INTRAMUSCULAR; INTRAVENOUS
Status: DISCONTINUED | OUTPATIENT
Start: 2020-04-30 | End: 2020-04-30 | Stop reason: HOSPADM

## 2020-04-30 RX ORDER — ROCURONIUM BROMIDE 10 MG/ML
INJECTION, SOLUTION INTRAVENOUS AS NEEDED
Status: DISCONTINUED | OUTPATIENT
Start: 2020-04-30 | End: 2020-04-30 | Stop reason: SURG

## 2020-04-30 RX ADMIN — ROCURONIUM BROMIDE 10 MG: 10 INJECTION INTRAVENOUS at 07:51

## 2020-04-30 RX ADMIN — SODIUM CHLORIDE, POTASSIUM CHLORIDE, SODIUM LACTATE AND CALCIUM CHLORIDE 9 ML/HR: 600; 310; 30; 20 INJECTION, SOLUTION INTRAVENOUS at 06:25

## 2020-04-30 RX ADMIN — SODIUM CHLORIDE, POTASSIUM CHLORIDE, SODIUM LACTATE AND CALCIUM CHLORIDE: 600; 310; 30; 20 INJECTION, SOLUTION INTRAVENOUS at 11:43

## 2020-04-30 RX ADMIN — ROCURONIUM BROMIDE 10 MG: 10 INJECTION INTRAVENOUS at 10:23

## 2020-04-30 RX ADMIN — PROPOFOL 200 MG: 10 INJECTION, EMULSION INTRAVENOUS at 07:41

## 2020-04-30 RX ADMIN — ROCURONIUM BROMIDE 10 MG: 10 INJECTION INTRAVENOUS at 10:26

## 2020-04-30 RX ADMIN — CEFAZOLIN SODIUM 2 G: 2 INJECTION, SOLUTION INTRAVENOUS at 07:29

## 2020-04-30 RX ADMIN — LIDOCAINE HYDROCHLORIDE 50 MG: 10 INJECTION, SOLUTION EPIDURAL; INFILTRATION; INTRACAUDAL; PERINEURAL at 07:41

## 2020-04-30 RX ADMIN — FAMOTIDINE 20 MG: 20 TABLET ORAL at 06:25

## 2020-04-30 RX ADMIN — CEFAZOLIN SODIUM 1 G: 2 INJECTION, SOLUTION INTRAVENOUS at 11:36

## 2020-04-30 RX ADMIN — DEXAMETHASONE SODIUM PHOSPHATE 8 MG: 4 INJECTION, SOLUTION INTRAMUSCULAR; INTRAVENOUS at 07:46

## 2020-04-30 RX ADMIN — LIDOCAINE HYDROCHLORIDE 0.2 ML: 10 INJECTION, SOLUTION EPIDURAL; INFILTRATION; INTRACAUDAL; PERINEURAL at 06:25

## 2020-04-30 RX ADMIN — ROCURONIUM BROMIDE 10 MG: 10 INJECTION INTRAVENOUS at 08:19

## 2020-04-30 RX ADMIN — REMIFENTANIL HYDROCHLORIDE 0.12 MCG/KG/MIN: 1 INJECTION, POWDER, LYOPHILIZED, FOR SOLUTION INTRAVENOUS at 08:59

## 2020-04-30 RX ADMIN — Medication 15 MG: at 10:54

## 2020-04-30 NOTE — ANESTHESIA PROCEDURE NOTES
Airway  Urgency: elective    Date/Time: 4/30/2020 7:43 AM  Airway not difficult    General Information and Staff    Patient location during procedure: OR  CRNA: Mello Gold CRNA    Indications and Patient Condition  Indications for airway management: airway protection    Preoxygenated: yes  MILS not maintained throughout  Mask difficulty assessment: 1 - vent by mask    Final Airway Details  Final airway type: endotracheal airway      Successful airway: ETT  Cuffed: yes   Successful intubation technique: direct laryngoscopy  Endotracheal tube insertion site: oral  Blade: Morgan  Blade size: 2  ETT size (mm): 7.5  Cormack-Lehane Classification: grade I - full view of glottis  Placement verified by: chest auscultation and capnometry   Measured from: lips  ETT/EBT  to lips (cm): 20  Number of attempts at approach: 1  Assessment: lips, teeth, and gum same as pre-op and atraumatic intubation    Additional Comments  Negative epigastric sounds, Breath sound equal bilaterally with symmetric chest rise and fall

## 2020-05-04 LAB
CYTO UR: NORMAL
LAB AP CASE REPORT: NORMAL
LAB AP CLINICAL INFORMATION: NORMAL
LAB AP DIAGNOSIS COMMENT: NORMAL
PATH REPORT.FINAL DX SPEC: NORMAL
PATH REPORT.GROSS SPEC: NORMAL

## 2020-05-08 ENCOUNTER — HOSPITAL ENCOUNTER (EMERGENCY)
Facility: HOSPITAL | Age: 60
Discharge: HOME | End: 2020-05-08
Attending: EMERGENCY MEDICINE | Admitting: EMERGENCY MEDICINE

## 2020-05-08 VITALS
HEIGHT: 78 IN | BODY MASS INDEX: 27.77 KG/M2 | OXYGEN SATURATION: 95 % | HEART RATE: 91 BPM | WEIGHT: 240 LBS | TEMPERATURE: 98.2 F | DIASTOLIC BLOOD PRESSURE: 74 MMHG | RESPIRATION RATE: 18 BRPM | SYSTOLIC BLOOD PRESSURE: 151 MMHG

## 2020-05-08 DIAGNOSIS — K56.41 FECAL IMPACTION (HCC): Primary | ICD-10-CM

## 2020-05-08 DIAGNOSIS — K59.03 DRUG-INDUCED CONSTIPATION: ICD-10-CM

## 2020-05-08 PROCEDURE — 99284 EMERGENCY DEPT VISIT MOD MDM: CPT

## 2020-05-08 RX ORDER — SODIUM PHOSPHATE,MONO-DIBASIC 19G-7G/118
1 ENEMA (ML) RECTAL ONCE
Status: DISCONTINUED | OUTPATIENT
Start: 2020-05-08 | End: 2020-05-08

## 2020-05-08 RX ORDER — MAG HYDROX/ALUMINUM HYD/SIMETH 400-400-40
2 SUSPENSION, ORAL (FINAL DOSE FORM) ORAL ONCE
Status: COMPLETED | OUTPATIENT
Start: 2020-05-08 | End: 2020-05-08

## 2020-05-08 RX ORDER — MINERAL OIL 100 G/100G
1 OIL RECTAL ONCE
Status: COMPLETED | OUTPATIENT
Start: 2020-05-08 | End: 2020-05-08

## 2020-05-08 RX ORDER — BISACODYL 10 MG
10 SUPPOSITORY, RECTAL RECTAL DAILY
Status: DISCONTINUED | OUTPATIENT
Start: 2020-05-08 | End: 2020-05-08 | Stop reason: HOSPADM

## 2020-05-08 RX ADMIN — BISACODYL 10 MG: 10 SUPPOSITORY RECTAL at 15:50

## 2020-05-08 RX ADMIN — GLYCERIN 2 SUPPOSITORY: 2 SUPPOSITORY RECTAL at 15:50

## 2020-05-08 RX ADMIN — MINERAL OIL 1 ENEMA: 100 ENEMA RECTAL at 17:42

## 2020-05-09 NOTE — ED PROVIDER NOTES
Subjective   Patient presents to the emergency department with complaint of constipation; states he has had no success with using milk of magnesia, stool softeners or MiraLAX at home since neck surgery on April 30.  Patient has been taking hydrocodone 7.5 mg every 6 hours in his recovery.  Patient states he is eating and drinking well.  He is experiencing no fever abdominal pain.  States he is drinking fluids adequately and abundantly although he has had limited solids and limited appetite.  Patient is experiencing fullness in his rectal vault with urgency without successful evacuation.  The fullness in his rectum is uncomfortable, limiting his ability to sit effectively          History provided by:  Patient   used: No    Rectal Pain   Location:  Rectal fullness  Severity:  Moderate  Onset quality:  Gradual  Duration:  3 days  Timing:  Constant  Progression:  Worsening  Chronicity:  New  Associated symptoms: no abdominal pain, no congestion, no diarrhea, no fever, no nausea and no vomiting        Review of Systems   Constitutional: Negative for fever.   HENT: Negative for congestion.    Gastrointestinal: Positive for constipation and rectal pain. Negative for abdominal distention, abdominal pain, anal bleeding, blood in stool, diarrhea, nausea and vomiting.       Past Medical History:   Diagnosis Date   • Acute pharyngitis    • Anxiety    • Cancer (CMS/[facility]) 2012 and 2016    skin cancer; resolved   • Cancer (CMS/[facility]) 04/17/2020    tonsil R and nodes   • Hyperlipidemia    • Hypertension        No Known Allergies    Past Surgical History:   Procedure Laterality Date   • CATARACT EXTRACTION  2011 and 2019   • ESOPHAGOSCOPY N/A 4/30/2020    Procedure: ESOPHAGOSCOPY;  Surgeon: Andrews Singh MD;  Location: [facility] OR;  Service: ENT;  Laterality: N/A;   • LARYNGOSCOPY N/A 4/30/2020    Procedure: LARYNGOSCOPY;  Surgeon: Andrews Singh MD;  Location: [facility] OR;  Service: ENT;  Laterality: N/A;    • NECK DISSECTION Right 4/30/2020    Procedure: MODIFIED NECK DISSECTION RIGHT;  Surgeon: Andrews Singh MD;  Location:  [facility];  Service: ENT;  Laterality: Right;   • TONSILLECTOMY Right 4/30/2020    Procedure: TONISLLECTOMY RIGHT;  Surgeon: Andrews Singh MD;  Location:  [facility] OR;  Service: ENT;  Laterality: Right;       Family History   Problem Relation Age of Onset   • COPD Mother    • Lung cancer Father        Social History     Socioeconomic History   • Marital status:      Spouse name: Not on file   • Number of children: Not on file   • Years of education: Not on file   • Highest education level: Not on file   Tobacco Use   • Smoking status: Never Smoker   • Smokeless tobacco: Never Used   Substance and Sexual Activity   • Alcohol use: Yes     Comment: social   • Drug use: No   • Sexual activity: Defer           Objective   Physical Exam   Constitutional: He is oriented to person, place, and time. He appears well-developed and well-nourished.   Patient has normal vital signs.  He is an excellent historian.  He appears uncomfortable   HENT:   Head: Normocephalic and atraumatic.   Eyes: Conjunctivae are normal.   Neck: Normal range of motion.   Cardiovascular: Normal rate and regular rhythm.   Pulmonary/Chest: Effort normal and breath sounds normal.   Abdominal: Soft. Bowel sounds are normal. There is no tenderness.   Genitourinary:   Genitourinary Comments: Rectal exam reveals no hemorrhoids.  Digital exam reveals a large caliber, hard stool in his rectal vault.   Musculoskeletal: Normal range of motion.   Neurological: He is alert and oriented to person, place, and time.   Skin: Skin is warm and dry.   Psychiatric: He has a normal mood and affect. His behavior is normal. Judgment and thought content normal.       Procedures           ED Course  ED Course as of May 08 2104   Fri May 08, 2020   1844 After use of suppositories, and admin of mineral oil enema, patient has had no relief and  consents to manual disimpaction attempt.  After one pass through his rectum with one finger, patient wants to defer momentarily due to the marked discomfort of disimpaction.  Will observe and reassess.      [MS]   1859 2nd attempt to manually disimpact produced a large volume of stool but patient finds this process far to uncomfortable to endure.  I can feel that the stool that remains is clearly softer in texture.  Will initiate a milk and molasses enema and patient concurs.    [MS]   2033 Patient has had relief with a large bowel movement in response to the milk and molasses enema.  Patient is satisfied and ready for DC.  We discussed parameters for concern that would warrant return to the ED; as well as discussed constipation management at home      [MS]      ED Course User Index  [MS] Mallorie Steven APRN                                           [facility]    Final diagnoses:   Fecal impaction (CMS/HCC)   Drug-induced constipation            Mallorie Steven APRN  05/08/20 [id number]

## 2020-05-09 NOTE — DISCHARGE INSTRUCTIONS
Drink ample clear fluids.  Consider milk of magnesia or magnesium citrate as needed according to instructions for stimulant laxative use.  Also consider adult glycerin suppositories, which are available at any pharmacy.  Return to the emergency department as needed for worsening symptoms or concerns which include, but are not limited to: Fever, intractable pain, or intractable vomiting.  Thank you

## 2020-05-20 ENCOUNTER — OFFICE VISIT (OUTPATIENT)
Dept: RADIATION ONCOLOGY | Facility: HOSPITAL | Age: 60
End: 2020-05-20

## 2020-05-20 ENCOUNTER — HOSPITAL ENCOUNTER (OUTPATIENT)
Dept: RADIATION ONCOLOGY | Facility: HOSPITAL | Age: 60
Setting detail: RADIATION/ONCOLOGY SERIES
Discharge: HOME | End: 2020-05-20

## 2020-05-20 VITALS
TEMPERATURE: 98.1 F | SYSTOLIC BLOOD PRESSURE: 141 MMHG | HEIGHT: 78 IN | WEIGHT: 249.1 LBS | DIASTOLIC BLOOD PRESSURE: 80 MMHG | RESPIRATION RATE: 20 BRPM | OXYGEN SATURATION: 97 % | BODY MASS INDEX: 28.82 KG/M2 | HEART RATE: 71 BPM

## 2020-05-20 DIAGNOSIS — C09.9 TONSIL CANCER (HCC): Primary | ICD-10-CM

## 2020-05-20 RX ORDER — FINASTERIDE 1 MG/1
1 TABLET, FILM COATED ORAL DAILY
COMMUNITY

## 2020-05-20 NOTE — PROGRESS NOTES
CONSULTATION NOTE      :                                                          1960  DATE OF CONSULTATION:                       2020   REQUESTING PHYSICIAN:                   Andrews Singh MD  REASON FOR CONSULTATION:           Tonsil cancer (CMS/[facility])  - Stage I (cT1, cN1, cM0, p16+)         BRIEF HISTORY:  The patient is a very pleasant 60 y.o. male  with recent diagnosed oropharyngeal cancer.  He presented with a persistent right upper neck mass which was enlarging over several months.    Fine-needle aspiration was consistent with squamous cell carcinoma.  There was insufficient material to perform P 16 testing.    Direct exam and laryngoscopy revealed a mass arising from the right tonsillar fossa which did not extend onto the tongue base or the oral pharyngeal wall.  Vocal cords were mobile.  Contrast-enhanced CT scan of the neck showed a 3 x 2 cm right upper neck level 2 lymph node.  Remaining lymph nodes in the right neck were less than 1 cm in size.  There was fullness in the right oropharynx without definite visualized mass.  Incidentally noted is a 10 mm subcutaneous nodule in the right lower neck/upper back presumed to be a benign sebaceous cyst.  Right tonsillectomy and neck dissection by Dr. Singh on 2020.  The right tonsil tumor measured 1.7 x 1 x 0.8 cm.  This was infiltrating squamous carcinoma of the P 16 expression.  Deep margin was close, less than 1 mm, peripheral margins were negative.  No lymphovascular invasion was identified.  1 of 23 lymph nodes were positive for metastatic squamous cell carcinoma.  Tumor measured 2.3 cm diameter with no extracapsular extension of the structures.  He is healing well.  Pain and swallowing are significantly improving.      No Known Allergies    Social History     Socioeconomic History   • Marital status:      Spouse name: Not on file   • Number of children: Not on file   • Years of education: Not on file   • Highest  education level: Not on file   Tobacco Use   • Smoking status: Never Smoker   • Smokeless tobacco: Never Used   Substance and Sexual Activity   • Alcohol use: Yes     Comment: 3-12 drinks per week   • Drug use: No   • Sexual activity: Defer       Past Medical History:   Diagnosis Date   • Acute pharyngitis    • Anxiety    • Cancer (CMS/[facility]) 2012 and 2016    skin cancer; resolved   • Cancer (CMS/[facility]) 04/17/2020    tonsil R and nodes   • Hyperlipidemia    • Hypertension    • Skin cancer        family history includes COPD in his mother; Cancer in his sister; Lung cancer in his father; Skin cancer in his brother.     Past Surgical History:   Procedure Laterality Date   • CATARACT EXTRACTION  2011 and 2019   • ESOPHAGOSCOPY N/A 4/30/2020    Procedure: ESOPHAGOSCOPY;  Surgeon: Andrews Singh MD;  Location:  [facility];  Service: ENT;  Laterality: N/A;   • LARYNGOSCOPY N/A 4/30/2020    Procedure: LARYNGOSCOPY;  Surgeon: Andrews Singh MD;  Location:  [facility];  Service: ENT;  Laterality: N/A;   • NECK DISSECTION Right 4/30/2020    Procedure: MODIFIED NECK DISSECTION RIGHT;  Surgeon: Andrews Singh MD;  Location:  [facility];  Service: ENT;  Laterality: Right;   • SKIN CANCER EXCISION      right shoulder, right side of face   • TONSILLECTOMY Right 4/30/2020    Procedure: TONISLLECTOMY RIGHT;  Surgeon: Andrews Singh MD;  Location:  [facility];  Service: ENT;  Laterality: Right;        Review of Systems   Constitutional: Positive for fatigue and unexpected weight change (10-15 lb weight loss with surgery).   HENT:   Positive for voice change.    Eyes: Negative.    Respiratory: Positive for shortness of breath and wheezing.    Cardiovascular: Negative.    Gastrointestinal: Positive for constipation.   Endocrine: Negative.    Genitourinary: Negative.     Musculoskeletal: Positive for neck stiffness.   Skin: Negative.    Neurological: Positive for numbness (right side of neck and ear).   Hematological: Negative.   "  Psychiatric/Behavioral: Positive for confusion and depression.           Objective   VITAL SIGNS:   Vitals:    05/20/20 0830   BP: 141/80   Pulse: 71   Resp: 20   Temp: 98.1 °F (36.7 °C)   TempSrc: Temporal   SpO2: 97%   Weight: 113 kg (249 lb 1.6 oz)   Height: 200.7 cm (79\")   PainSc:   2   PainLoc: Throat        Karnofsky score: 80        Physical Exam   Constitutional: He is oriented to person, place, and time. He appears well-developed and well-nourished.   HENT:   Well-healing right neck incision with thickening along the superficial suture line but no suspicious palpable mass.  Intraoral examination reveals teeth in good repair.  Mouth is moist.  The right tonsil bed shows white clean healing mucosa but no suspicious mass.  Palate moves symmetrically.  Tongue is not tethered.   Neck: Normal range of motion. Neck supple.   Cardiovascular: Normal rate, regular rhythm and normal heart sounds.   No murmur heard.  Pulmonary/Chest: Effort normal and breath sounds normal. He has no wheezes. He has no rales.   Abdominal: Soft. Bowel sounds are normal. He exhibits no distension. There is no hepatosplenomegaly. There is no tenderness.   Musculoskeletal: Normal range of motion. He exhibits no edema or tenderness.   Lymphadenopathy:     He has no cervical adenopathy.     He has no axillary adenopathy.        Right: No supraclavicular adenopathy present.        Left: No supraclavicular adenopathy present.   Neurological: He is alert and oriented to person, place, and time. He has normal strength. No sensory deficit.   Skin: Skin is warm and dry.   Psychiatric: He has a normal mood and affect. His behavior is normal. Judgment and thought content normal.   Nursing note and vitals reviewed.           The following portions of the patient's history were reviewed and updated as appropriate: allergies, current medications, past family history, past medical history, past social history, past surgical history and problem " list.    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  He is 3 weeks status post removal of all gross disease with right tonsillectomy and neck dissection.  Adjuvant radiotherapy is indicated for close surgical margin.  Concurrent chemotherapy is not necessary.  Prognosis remains excellent.  Informed consent obtained today.    RECOMMENDATIONS: We reviewed the literature showing significantly better prognosis with viral associated neoplasms in this location.  Preliminary data indicates very good local tumor control with lower doses of radiotherapy compared to non-HPV related malignancies; however, long-term data using lower dose treatment is not yet available.  Patient is extremely interested in receiving lower dose radiotherapy outside of a clinical trial.  I will order a PET CT to confirm absence of any gross residual disease, in which case he will likely undergo a shorter course of radiotherapy to deliver 45 Gray over 5 weeks to the right neck and oropharynx, 50 Rodriguez simultaneous integrated boost the previous areas of tumor and close margin.  IMRT technique treatment delivery and image guidance will be used for daily set up.  He will likely begin treatment around 6/8/2020, which will place him in 5-1/2 weeks after surgery.    Return in about 1 week (around 5/27/2020) for Imaging - See orders, Simulation.  Corky was seen today for head and neck cancer.    Diagnoses and all orders for this visit:    Tonsil cancer (CMS/[facility])  -     NM Pet Skull Base To Mid Thigh; Future  -     Ambulatory Referral to Speech Therapy for Head/Neck Cancer Services  -     Ambulatory Referral to OP ONC Nutrition Services  -     FL Video Swallow With Speech; Future         Justice Steve MD

## 2020-05-21 ENCOUNTER — DOCUMENTATION (OUTPATIENT)
Dept: NUTRITION | Facility: HOSPITAL | Age: 60
End: 2020-05-21

## 2020-05-21 NOTE — PROGRESS NOTES
Oncology Nutrition Screening    Patient Name:  Corky Wan  YOB: 1960  MRN: [id number]  Date:  05/21/20  Physician:   Dr. Justice Steve    Type of Cancer Treatment:   Surgery: Right tonsillectomy and neck dissection by Dr. Singh on 4/30/2020; infiltrating squamous carcinoma of the P 16 expression.  Deep margin was close, less than 1 mm, peripheral margins were negative.  No lymphovascular invasion was identified.  1 of 23 lymph nodes were positive for metastatic squamous cell carcinoma.  Radiation:  [place] course of radiotherapy to deliver 45 Gray over 5 weeks to the right neck and oropharynx, 50 Rodriguez simultaneous integrated boost the previous areas of tumor and close margin  Chemotherapy not indicated    Patient Active Problem List   Diagnosis   • Itching   • Atopic dermatitis   • Tonsil cancer (CMS/HCC)       Current Outpatient Medications   Medication Sig Dispense Refill   • amLODIPine (NORVASC) 10 MG tablet take 1 tablet by mouth once daily 30 tablet 0   • atorvastatin (LIPITOR) 20 MG tablet Take 20 mg by mouth Every Night.     • escitalopram (LEXAPRO) 20 MG tablet take 1 tablet by mouth once daily 30 tablet 0   • finasteride (PROPECIA) 1 MG tablet Take 1 mg by mouth Daily.     • irbesartan (AVAPRO) 300 MG tablet take 1 tablet by mouth once daily 30 tablet 0     No current facility-administered medications for this visit.        Glycemic Risk:   NA    Weight:   Height: 79 inches  Weight: 249.1 lbs.  Usual Body Weight: 260 lbs.   BMI: 28.1   Overweight  Weight loss of 10-15 lbs with surgery (5% weight loss) related to surgery    Oral Food Intake:  Regular Diet - No Restrictions  Softer food choices in the post operative period    Hydration Status:   How many 8 ounce glass of water of fluid do you drink per day?  To be assessed with consultation    Enteral Feeding:   NA    Nutrition Symptoms:   Fatigue  Constipation    Activity:   Not my normal self, but able to be up and about with  fairly normal activities     reports that he has never smoked. He has never used smokeless tobacco. He reports that he drinks alcohol. He reports that he does not use drugs.    Evaluation of Nutritional Risk:   Patient is not identified to be at nutritional risk for malnutrition, but potential exists with head and neck cancer diagnosis and radiation treatment plan.     Referral received; phone consultation with patient.  Patient states that he remains with postoperative swallowing discomfort, but it has improved and he has been able to return to a fairly normal diet, choosing softer consistencies.  He lost approximately 10-15 lbs in the postoperative period.     Discussed the importance of nutrition with diagnosis and treatment plan, focusing on nutrient density, high calorie, high protein food choices.  He states that his mother was a dietitian, so he feels that he eats well and is conscious of making those choices.  He states that immediately post operative, he relied on high protein shakes and will occasionally still drink those to boost intake.    Discussed the possible side effects of treatment as it relates to nutrition, including weight loss, LBM loss, dry mouth, taste changes and possible indication for placement of a feeding tube in the event patient becomes unable to properly nourish and hydrate.  Written information on dry mouth emailed to patient, with encouragement to start using the baking soda, salt and water mouth rinses now.    All questions / concerns addressed; will follow closely when he begins treatment around the first week of June.              Electronically signed by:  Ailyn Aguirre RD  13:21

## 2020-05-27 ENCOUNTER — HOSPITAL ENCOUNTER (OUTPATIENT)
Dept: PET IMAGING | Facility: HOSPITAL | Age: 60
Discharge: HOME | End: 2020-05-27
Admitting: RADIOLOGY

## 2020-05-27 ENCOUNTER — HOSPITAL ENCOUNTER (OUTPATIENT)
Dept: PET IMAGING | Facility: HOSPITAL | Age: 60
Discharge: HOME | End: 2020-05-27

## 2020-05-27 DIAGNOSIS — C09.9 TONSIL CANCER (HCC): ICD-10-CM

## 2020-05-27 LAB — GLUCOSE BLDC GLUCOMTR-MCNC: 89 MG/DL (ref 70–130)

## 2020-05-27 PROCEDURE — 82962 GLUCOSE BLOOD TEST: CPT

## 2020-05-27 PROCEDURE — 0 FLUDEOXYGLUCOSE F18 SOLUTION: Performed by: RADIOLOGY

## 2020-05-27 PROCEDURE — A9552 F18 FDG: HCPCS | Performed by: RADIOLOGY

## 2020-05-27 PROCEDURE — 78815 PET IMAGE W/CT SKULL-THIGH: CPT

## 2020-05-27 RX ADMIN — FLUDEOXYGLUCOSE F18 1 DOSE: 300 INJECTION INTRAVENOUS at 08:24

## 2020-05-28 ENCOUNTER — HOSPITAL ENCOUNTER (OUTPATIENT)
Dept: RADIATION ONCOLOGY | Facility: HOSPITAL | Age: 60
Discharge: HOME | End: 2020-05-28

## 2020-05-28 ENCOUNTER — DOCUMENTATION (OUTPATIENT)
Dept: RADIATION ONCOLOGY | Facility: HOSPITAL | Age: 60
End: 2020-05-28

## 2020-05-28 PROCEDURE — 77290 THER RAD SIMULAJ FIELD CPLX: CPT | Performed by: RADIOLOGY

## 2020-05-28 PROCEDURE — 77334 RADIATION TREATMENT AID(S): CPT | Performed by: RADIOLOGY

## 2020-05-28 NOTE — PROGRESS NOTES
He is healing well after surgery.  He still has hoarseness.  Staging imaging PET/CT shows uptake in the region of the right tonsillar fossa primary site and in the right upper neck level 2A, sites of resected tumor.    The only other area of uptake is in the contralateral left true vocal cord which may be consistent with decreased mobility of the right vocal cord.  Dr. Singh is aware and will follow.  Patient will also have speech pathology evaluation with modified barium swallow.  We will proceed with treatment planning CT imaging today.  Radiotherapy will likely begin in approximately 1 week.

## 2020-06-01 ENCOUNTER — HOSPITAL ENCOUNTER (OUTPATIENT)
Dept: RADIATION ONCOLOGY | Facility: HOSPITAL | Age: 60
Setting detail: RADIATION/ONCOLOGY SERIES
Discharge: HOME | End: 2020-06-01

## 2020-06-02 ENCOUNTER — HOSPITAL ENCOUNTER (OUTPATIENT)
Dept: SPEECH THERAPY | Facility: HOSPITAL | Age: 60
Setting detail: THERAPIES SERIES
Discharge: HOME | End: 2020-06-02

## 2020-06-02 DIAGNOSIS — J38.01 VOCAL CORD PARALYSIS, UNILATERAL COMPLETE: ICD-10-CM

## 2020-06-02 DIAGNOSIS — C09.9 TONSIL CANCER (HCC): ICD-10-CM

## 2020-06-02 DIAGNOSIS — R13.10 DYSPHAGIA, UNSPECIFIED TYPE: Primary | ICD-10-CM

## 2020-06-02 PROCEDURE — 92523 SPEECH SOUND LANG COMPREHEN: CPT

## 2020-06-02 PROCEDURE — 92610 EVALUATE SWALLOWING FUNCTION: CPT

## 2020-06-05 PROCEDURE — 77338 DESIGN MLC DEVICE FOR IMRT: CPT | Performed by: RADIOLOGY

## 2020-06-05 PROCEDURE — 77300 RADIATION THERAPY DOSE PLAN: CPT | Performed by: RADIOLOGY

## 2020-06-05 PROCEDURE — 77301 RADIOTHERAPY DOSE PLAN IMRT: CPT | Performed by: RADIOLOGY

## 2020-06-08 ENCOUNTER — HOSPITAL ENCOUNTER (OUTPATIENT)
Dept: RADIATION ONCOLOGY | Facility: HOSPITAL | Age: 60
Discharge: HOME | End: 2020-06-08

## 2020-06-08 PROCEDURE — 77386: CPT | Performed by: RADIOLOGY

## 2020-06-09 ENCOUNTER — DOCUMENTATION (OUTPATIENT)
Dept: NUTRITION | Facility: HOSPITAL | Age: 60
End: 2020-06-09

## 2020-06-09 ENCOUNTER — HOSPITAL ENCOUNTER (OUTPATIENT)
Dept: RADIATION ONCOLOGY | Facility: HOSPITAL | Age: 60
Discharge: HOME | End: 2020-06-09

## 2020-06-09 VITALS — BODY MASS INDEX: 26.27 KG/M2 | WEIGHT: 233.2 LBS

## 2020-06-09 PROCEDURE — 77386: CPT | Performed by: RADIOLOGY

## 2020-06-09 NOTE — PROGRESS NOTES
ONC Nutrition    Diagnosis:  Tonsil cancer  Surgery: Right tonsillectomy and neck dissection by Dr. Singh on 4/30/2020; infiltrating squamous carcinoma of the P 16 expression.  Deep margin was close, less than 1 mm, peripheral margins were negative.  No lymphovascular invasion was identified.  1 of 23 lymph nodes were positive for metastatic squamous cell carcinoma.  Radiation:  [place] course of radiotherapy to deliver 45 Gray over 5 weeks to the right neck and oropharynx, 50 Rodriguez simultaneous integrated boost the previous areas of tumor and close margin / patient has completed 2 treatments.  Chemotherapy not indicated    SLP Evaluation: Mild dysphagia and moderate-severe voice disorder / no diet   recommendation indicated    Weight 233.2 lbs    Patient has been doing fairly well since our initial phone consultation.  He states that he continues with vocal cord paralysis which has posed difficulty with swallowing, but he stills manages to eat with softer consistencies and textures and is eating well. The insurance company has denied injection for paralysis, but it is now under appeal.    Will continue to follow.

## 2020-06-10 ENCOUNTER — HOSPITAL ENCOUNTER (OUTPATIENT)
Dept: RADIATION ONCOLOGY | Facility: HOSPITAL | Age: 60
Discharge: HOME | End: 2020-06-10

## 2020-06-10 PROCEDURE — 77386: CPT | Performed by: RADIOLOGY

## 2020-06-11 ENCOUNTER — HOSPITAL ENCOUNTER (OUTPATIENT)
Dept: RADIATION ONCOLOGY | Facility: HOSPITAL | Age: 60
Discharge: HOME | End: 2020-06-11

## 2020-06-11 PROCEDURE — 77386: CPT | Performed by: RADIOLOGY

## 2020-06-12 ENCOUNTER — HOSPITAL ENCOUNTER (OUTPATIENT)
Dept: RADIATION ONCOLOGY | Facility: HOSPITAL | Age: 60
Discharge: HOME | End: 2020-06-12

## 2020-06-12 PROCEDURE — 77386: CPT | Performed by: RADIOLOGY

## 2020-06-12 PROCEDURE — 77336 RADIATION PHYSICS CONSULT: CPT | Performed by: RADIOLOGY

## 2020-06-15 ENCOUNTER — APPOINTMENT (OUTPATIENT)
Dept: GENERAL RADIOLOGY | Facility: HOSPITAL | Age: 60
End: 2020-06-15

## 2020-06-15 ENCOUNTER — HOSPITAL ENCOUNTER (OUTPATIENT)
Dept: RADIATION ONCOLOGY | Facility: HOSPITAL | Age: 60
Discharge: HOME | End: 2020-06-15

## 2020-06-15 PROCEDURE — 77386: CPT | Performed by: RADIOLOGY

## 2020-06-16 ENCOUNTER — DOCUMENTATION (OUTPATIENT)
Dept: NUTRITION | Facility: HOSPITAL | Age: 60
End: 2020-06-16

## 2020-06-16 ENCOUNTER — HOSPITAL ENCOUNTER (OUTPATIENT)
Dept: RADIATION ONCOLOGY | Facility: HOSPITAL | Age: 60
Discharge: HOME | End: 2020-06-16

## 2020-06-16 VITALS — BODY MASS INDEX: 25.97 KG/M2 | WEIGHT: 230.5 LBS

## 2020-06-16 PROCEDURE — 77386: CPT | Performed by: RADIOLOGY

## 2020-06-16 NOTE — PROGRESS NOTES
ONC Nutrition     Diagnosis:  Tonsil cancer  Surgery: Right tonsillectomy and neck dissection by Dr. 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  Deep margin was close, less than 1 mm, peripheral margins were negative.  No lymphovascular invasion was identified.  1 of 23 lymph nodes were positive for metastatic squamous cell carcinoma.  Radiation:  [place] course of radiotherapy to deliver 45 Gray over 5 weeks to the right neck and oropharynx, 50 Rodriguez simultaneous integrated boost the previous areas of tumor and close margin / patient has completed 2 treatments.  Chemotherapy not indicated     SLP Evaluation: Mild dysphagia and moderate-severe voice disorder / no diet   recommendation indicated     Weight 230.5 lbs / 2.7 lbs weight loss over the past week    Patient continues to do well with completion of the first week's treatment.  He is already noting taste changes and slight degree of xerostomia.  He does continue to use the baking soda, salt and water mouth rinses several times throughout the day; encouraged him to continue use.  Provided tips for flavor enhancement of foods including herbs, spices, balsamatic vinegars, citrus juices, etc.    Patient received injection for vocal cord last week; is very pleased with results.  Appetite is good and he is eating well; doing a lot of physical activity outside, which may explain part of the weight loss last week.

## 2020-06-17 ENCOUNTER — HOSPITAL ENCOUNTER (OUTPATIENT)
Dept: RADIATION ONCOLOGY | Facility: HOSPITAL | Age: 60
Discharge: HOME | End: 2020-06-17

## 2020-06-17 PROCEDURE — 77386: CPT | Performed by: RADIOLOGY

## 2020-06-18 ENCOUNTER — HOSPITAL ENCOUNTER (OUTPATIENT)
Dept: RADIATION ONCOLOGY | Facility: HOSPITAL | Age: 60
Discharge: HOME | End: 2020-06-18

## 2020-06-18 ENCOUNTER — HOSPITAL ENCOUNTER (OUTPATIENT)
Dept: SPEECH THERAPY | Facility: HOSPITAL | Age: 60
Setting detail: THERAPIES SERIES
Discharge: HOME | End: 2020-06-18

## 2020-06-18 DIAGNOSIS — R13.10 DYSPHAGIA, UNSPECIFIED TYPE: Primary | ICD-10-CM

## 2020-06-18 DIAGNOSIS — C09.9 TONSIL CANCER (HCC): ICD-10-CM

## 2020-06-18 PROCEDURE — 77336 RADIATION PHYSICS CONSULT: CPT | Performed by: RADIOLOGY

## 2020-06-18 PROCEDURE — 77386: CPT | Performed by: RADIOLOGY

## 2020-06-18 PROCEDURE — 92526 ORAL FUNCTION THERAPY: CPT

## 2020-06-18 NOTE — THERAPY TREATMENT NOTE
Outpatient Speech Language Pathology   Adult Swallow Treatment Note  [facility]     Patient Name: Corky Wan  : 1960  MRN: [id number]  Today's Date: 2020         Visit Date: 2020   Patient Active Problem List   Diagnosis   • Itching   • Atopic dermatitis   • Tonsil cancer (CMS/[facility])        Visit Dx:    ICD-10-CM ICD-9-CM   1. Dysphagia, unspecified type R13.10 787.20   2. Tonsil cancer (CMS/[facility]) C09.9 146.0                         SLP OP Goals     Row Name 2030 20 0900       Goal Type Needed    Goal Type Needed  Dysphagia;Voice;Other Adult Goals  -HG  --  -HG       Subjective Comments    Subjective Comments  Pt alert, cooperative, reports having had gel injection to vocal fold on  and also has had PT since last session and this current session which addressed the neck tension.   -HG  --  -HG       Dysphagia Goals    Patient will safely consume the recommended diet without complications such as aspiration pneumonia  regular/thin   -HG  --  -HG    Status: Patient will safely consume the recommended diet without complications such as aspiration pneumonia  Progressing as expected  -HG  --  -HG    Comments: Patient will safely consume the recommended diet without complications such as aspiration pneumonia  20: Pt is salting food that he has never salted before. Pt is eating all solids with no difficulties.   -HG  --  -HG    Patient will increase laryngeal elevation to reduce residue that might fall into airway by completing  Mendelsohn maneuver;super-supraglottic swallow;falsetto/pitch glide;with cues  -HG  --  -HG    Status: Patient will increase laryngeal elevation to reduce residue that might fall into airway by completing  Progressing as expected  -HG  --  -HG    Comments: Patient will increase laryngeal elevation to reduce residue that might fall into airway by completing  20:  All exercises completed with accuracy except for falsetto/pitch which is  difficult to obtain with healing vocal fold.   -HG  --  -HG    Patient will increase closure of larynx to keep food from falling into the airway by completing  super-supraglottic swallow;with cues  -HG  --  -HG    Status: Patient will increase closure of larynx to keep food from falling into the airway by completing  Progressing as expected  -HG  --  -HG    Comments: Patient will increase closure of larynx to keep food from falling into the airway by completing  6/18/20: 100% accurate.   -HG  --  -HG    Patient will increase strength of tongue base and posterior pharyngeal walls to reduce residue that might fall into airway by completing  effotful swallow;Glory (tongue hold);with cues  -HG  --  -HG    Status: Patient will increase strength of tongue base and posterior pharyngeal walls to reduce residue that might fall into airway by completing  Progressing as expected  -HG  --  -HG    Comments: Patient will increase strength of tongue base and posterior pharyngeal walls to reduce residue that might fall into airway by completing  6/18/20: Effortful and Glory: 100% accurate.   -HG  --  -HG    Patient will improve hyolaryngeal elevation via completing Marv (head lift)  sustained lift (comment #/duration of lifts);repetitive lift (comment #/duration of lifts);with cues 30 secs and 30 reps  -HG  -- 30 secs and 30 reps  -HG    Status: Patient will improve hyolaryngeal elevation via completing Marv (head lift)  Progressing as expected  -HG  --  -HG    Comments: Patient will improve hyolaryngeal elevation via completing Marv (head lift)  6/18/20: CTAR completed at times in place of the sustained and repetitive lift.   -HG  --  -HG       Voice Goals    Patient will be able to engage in speech at the conversational level in all settings, using functional phonation, acceptable habitual pitch and balanced tone focus.  90%:;with intermittent cues  -HG  --  -HG    Status: Patient will be able to engage in speech at the  conversational level in all settings, using functional phonation, acceptable habitual pitch and balanced tone focus.  New  -HG  --  -HG    Patient will reduce hyperfunctional use of the vocal mechanism via laryngeal massage and/or other relaxation techniques for the external muscles of the neck, shoulders and chest  80%  -HG  --  -HG    Status: Patient will reduce hyperfunctional use of the vocal mechanism via laryngeal massage and/or other relaxation techniques for the external muscles of the neck, shoulders and chest  Progressing as expected  -HG  --  -HG    Comments: Patient will reduce hyperfunctional use of the vocal mechanism via laryngeal massage and/or other relaxation techniques for the external muscles of the neck, shoulders and chest  6/18/20: Pt reports that he has been completing and focusing on breathing exercises.   -HG  --  -HG    Patient will be able to use a balanced vocal resonance  80%:;with cues  -HG  --  -HG    Status: Patient will be able to use a balanced vocal resonance  Progressing as expected  -HG  --  -HG    Comments: Patient will be able to use a balanced vocal resonance  6/18/20:   -HG  --  -HG    Patient will be able to use functional phonation  80%:;with cues  -HG  --  -HG    Status: Patient will be able to use functional phonation  Progressing as expected  -HG  --  -HG    Comments: Patient will be able to use functional phonation  6/18/20: sustained /ah/:  17.32, 13.61, 11.98.   -HG  --  -HG       Other Goals    Other Adult Goal- 1  Pt will participate in MBSS in order to further assess swallow fxn with recs to follow as indicated.  -HG  --  -HG    Status: Other Adult Goal- 1  New  -HG  --  -HG    Comments: Other Adult Goal- 1  6/18/20: Plan to wait on MBSS at the completion of radiation due to his scheduled time for radiation daily at the time that OP MBS's are typically scheduled.   -HG  --  -HG       SLP Time Calculation    SLP Goal Re-Cert Due Date  07/02/20  -HG  --  -HG       User Key  (r) = Recorded By, (t) = Taken By, (c) = Cosigned By    Initials Name Provider Type    Teri Garcia MS CCC-SLP Speech and Language Pathologist          OP SLP Education     Row Name 06/18/20 0930       Education    Education Comments  Reviewed all exercises and will see pt at end of radiation tx.   -      User Key  (r) = Recorded By, (t) = Taken By, (c) = Cosigned By    Initials Name Effective Dates    Teri Garcia MS CCC-SLP 06/22/15 -           OP SLP Assessment/Plan - 06/18/20 0930        SLP Plan    Plan Comments  cont with dysphagia and voice tx.    -      User Key  (r) = Recorded By, (t) = Taken By, (c) = Cosigned By    Initials Name Provider Type    Teri Garcia MS CCC-SLP Speech and Language Pathologist                Time Calculation:   SLP Start Time: 0930    Therapy Charges for Today     Code Description Service Date Service Provider Modifiers Qty    99400077778 HC ST TREATMENT SWALLOW 2 6/18/2020 Teri Christie MS CCC-ESTRELLA GN 1                   MS REHAN Hester  6/18/2020

## 2020-06-19 ENCOUNTER — HOSPITAL ENCOUNTER (OUTPATIENT)
Dept: RADIATION ONCOLOGY | Facility: HOSPITAL | Age: 60
Discharge: HOME | End: 2020-06-19

## 2020-06-19 PROCEDURE — 77386: CPT | Performed by: RADIOLOGY

## 2020-06-22 ENCOUNTER — HOSPITAL ENCOUNTER (OUTPATIENT)
Dept: RADIATION ONCOLOGY | Facility: HOSPITAL | Age: 60
Discharge: HOME | End: 2020-06-22

## 2020-06-22 PROCEDURE — 77386: CPT | Performed by: RADIOLOGY

## 2020-06-23 ENCOUNTER — HOSPITAL ENCOUNTER (OUTPATIENT)
Dept: RADIATION ONCOLOGY | Facility: HOSPITAL | Age: 60
Discharge: HOME | End: 2020-06-23

## 2020-06-23 VITALS — WEIGHT: 227.5 LBS | BODY MASS INDEX: 25.63 KG/M2

## 2020-06-23 PROCEDURE — 77386: CPT | Performed by: RADIOLOGY

## 2020-06-24 ENCOUNTER — HOSPITAL ENCOUNTER (OUTPATIENT)
Dept: RADIATION ONCOLOGY | Facility: HOSPITAL | Age: 60
Discharge: HOME | End: 2020-06-24

## 2020-06-24 PROCEDURE — 77386: CPT | Performed by: RADIOLOGY

## 2020-06-25 ENCOUNTER — HOSPITAL ENCOUNTER (OUTPATIENT)
Dept: RADIATION ONCOLOGY | Facility: HOSPITAL | Age: 60
Discharge: HOME | End: 2020-06-25

## 2020-06-25 PROCEDURE — 77386: CPT | Performed by: RADIOLOGY

## 2020-06-26 ENCOUNTER — HOSPITAL ENCOUNTER (OUTPATIENT)
Dept: RADIATION ONCOLOGY | Facility: HOSPITAL | Age: 60
Discharge: HOME | End: 2020-06-26

## 2020-06-26 PROCEDURE — 77386: CPT | Performed by: RADIOLOGY

## 2020-06-26 PROCEDURE — 77336 RADIATION PHYSICS CONSULT: CPT | Performed by: RADIOLOGY

## 2020-06-29 ENCOUNTER — HOSPITAL ENCOUNTER (OUTPATIENT)
Dept: RADIATION ONCOLOGY | Facility: HOSPITAL | Age: 60
Discharge: HOME | End: 2020-06-29

## 2020-06-29 PROCEDURE — 77386: CPT | Performed by: RADIOLOGY

## 2020-06-30 ENCOUNTER — HOSPITAL ENCOUNTER (OUTPATIENT)
Dept: RADIATION ONCOLOGY | Facility: HOSPITAL | Age: 60
Discharge: HOME | End: 2020-06-30

## 2020-06-30 VITALS — WEIGHT: 225.4 LBS | BODY MASS INDEX: 25.39 KG/M2

## 2020-06-30 PROCEDURE — 77386: CPT | Performed by: RADIOLOGY

## 2020-07-01 ENCOUNTER — HOSPITAL ENCOUNTER (OUTPATIENT)
Dept: RADIATION ONCOLOGY | Facility: HOSPITAL | Age: 60
Discharge: HOME | End: 2020-07-01

## 2020-07-01 ENCOUNTER — HOSPITAL ENCOUNTER (OUTPATIENT)
Dept: RADIATION ONCOLOGY | Facility: HOSPITAL | Age: 60
Setting detail: RADIATION/ONCOLOGY SERIES
Discharge: HOME | End: 2020-07-01

## 2020-07-01 PROCEDURE — 77386: CPT | Performed by: RADIOLOGY

## 2020-07-02 ENCOUNTER — HOSPITAL ENCOUNTER (OUTPATIENT)
Dept: RADIATION ONCOLOGY | Facility: HOSPITAL | Age: 60
Discharge: HOME | End: 2020-07-02

## 2020-07-02 PROCEDURE — 77386: CPT | Performed by: RADIOLOGY

## 2020-07-02 PROCEDURE — 77336 RADIATION PHYSICS CONSULT: CPT | Performed by: RADIOLOGY

## 2020-07-06 ENCOUNTER — HOSPITAL ENCOUNTER (OUTPATIENT)
Dept: RADIATION ONCOLOGY | Facility: HOSPITAL | Age: 60
Discharge: HOME | End: 2020-07-06

## 2020-07-06 PROCEDURE — 77386: CPT | Performed by: RADIOLOGY

## 2020-07-07 ENCOUNTER — HOSPITAL ENCOUNTER (OUTPATIENT)
Dept: SPEECH THERAPY | Facility: HOSPITAL | Age: 60
Setting detail: THERAPIES SERIES
Discharge: HOME | End: 2020-07-07

## 2020-07-07 ENCOUNTER — HOSPITAL ENCOUNTER (OUTPATIENT)
Dept: RADIATION ONCOLOGY | Facility: HOSPITAL | Age: 60
Discharge: HOME | End: 2020-07-07

## 2020-07-07 VITALS — BODY MASS INDEX: 24.41 KG/M2 | WEIGHT: 216.7 LBS

## 2020-07-07 DIAGNOSIS — R13.10 DYSPHAGIA, UNSPECIFIED TYPE: Primary | ICD-10-CM

## 2020-07-07 PROCEDURE — 92526 ORAL FUNCTION THERAPY: CPT

## 2020-07-07 PROCEDURE — 77386: CPT | Performed by: RADIOLOGY

## 2020-07-07 NOTE — THERAPY PROGRESS REPORT/RE-CERT
"Outpatient Speech Language Pathology   Adult Swallow Progress Note  [facility]     Patient Name: Corky Wan  : 1960  MRN: [id number]  Today's Date: 2020         Visit Date: 2020   Patient Active Problem List   Diagnosis   • Itching   • Atopic dermatitis   • Tonsil cancer (CMS/[facility])        Visit Dx:    ICD-10-CM ICD-9-CM   1. Dysphagia, unspecified type R13.10 787.20                         SLP OP Goals     Row Name 20 0930          Goal Type Needed    Goal Type Needed  Dysphagia;Voice;Other Adult Goals  -HG        Subjective Comments    Subjective Comments  Pt alert, cooperative, loss of taste, painful swallow is low on the list.   -HG        Dysphagia Goals    Patient will safely consume the recommended diet without complications such as aspiration pneumonia  regular/thin   -HG     Status: Patient will safely consume the recommended diet without complications such as aspiration pneumonia  Progressing as expected  -HG     Comments: Patient will safely consume the recommended diet without complications such as aspiration pneumonia  20: Most calories are coming from liquids (70-75%). Soft foods mostly due to \"more palatable\" Pt states that meat is difficult other than fish. Pt states that powerade helps with taste and improves taste vs water with meals.   20: Pt is salting food that he has never salted before. Pt is eating all solids with no difficulties.   -HG     Patient will increase laryngeal elevation to reduce residue that might fall into airway by completing  Mendelsohn maneuver;super-supraglottic swallow;falsetto/pitch glide;with cues  -HG     Status: Patient will increase laryngeal elevation to reduce residue that might fall into airway by completing  Progressing as expected  -HG     Comments: Patient will increase laryngeal elevation to reduce residue that might fall into airway by completing  : Pt feels that his neck is more pliable with these exercises and use " of PT. 6/18/20:  All exercises completed with accuracy except for falsetto/pitch which is difficult to obtain with healing vocal fold.   -HG     Patient will increase closure of larynx to keep food from falling into the airway by completing  super-supraglottic swallow;with cues  -HG     Status: Patient will increase closure of larynx to keep food from falling into the airway by completing  Progressing as expected  -HG     Comments: Patient will increase closure of larynx to keep food from falling into the airway by completing  7/7/20: Pt reports no difficulty. 6/18/20: 100% accurate.   -HG     Patient will increase strength of tongue base and posterior pharyngeal walls to reduce residue that might fall into airway by completing  effotful swallow;Glory (tongue hold);with cues  -HG     Status: Patient will increase strength of tongue base and posterior pharyngeal walls to reduce residue that might fall into airway by completing  Progressing as expected  -HG     Comments: Patient will increase strength of tongue base and posterior pharyngeal walls to reduce residue that might fall into airway by completing  7/7/20: Pt reports no difficulty. 6/18/20: Effortful and Glory: 100% accurate.   -HG     Patient will improve hyolaryngeal elevation via completing Marv (head lift)  sustained lift (comment #/duration of lifts);repetitive lift (comment #/duration of lifts);with cues 30 secs and 30 reps  -HG     Status: Patient will improve hyolaryngeal elevation via completing Marv (head lift)  Progressing as expected  -HG     Comments: Patient will improve hyolaryngeal elevation via completing Marv (head lift)  7/720: sustained lift is helping with ROM and non tightening of the neck tissue. 6/18/20: CTAR completed at times in place of the sustained and repetitive lift.   -HG        Voice Goals    Patient will be able to engage in speech at the conversational level in all settings, using functional phonation, acceptable  habitual pitch and balanced tone focus.  90%:;with intermittent cues  -HG     Status: Patient will be able to engage in speech at the conversational level in all settings, using functional phonation, acceptable habitual pitch and balanced tone focus.  New  -HG     Patient will reduce hyperfunctional use of the vocal mechanism via laryngeal massage and/or other relaxation techniques for the external muscles of the neck, shoulders and chest  80%  -HG     Status: Patient will reduce hyperfunctional use of the vocal mechanism via laryngeal massage and/or other relaxation techniques for the external muscles of the neck, shoulders and chest  Progressing as expected  -HG     Comments: Patient will reduce hyperfunctional use of the vocal mechanism via laryngeal massage and/or other relaxation techniques for the external muscles of the neck, shoulders and chest  6/18/20: Pt reports that he has been completing and focusing on breathing exercises.   -HG     Patient will be able to use a balanced vocal resonance  80%:;with cues  -HG     Status: Patient will be able to use a balanced vocal resonance  Progressing as expected  -HG     Comments: Patient will be able to use a balanced vocal resonance  7/7/20: pt reports much improved vocal quality with follow up planned for ENT.   -HG     Patient will be able to use functional phonation  80%:;with cues  -HG     Status: Patient will be able to use functional phonation  Progressing as expected  -HG     Comments: Patient will be able to use functional phonation  7/7/20: sustained /ah/: 20.20, 17.912, 20.33 secs.   6/18/20: sustained /ah/:  17.32, 13.61, 11.98.   -HG        Other Goals    Other Adult Goal- 1  Pt will participate in MBSS in order to further assess swallow fxn with recs to follow as indicated.  -HG     Status: Other Adult Goal- 1  New;Progressing as expected  -HG     Comments: Other Adult Goal- 1  7/7/20: Pt reports wanting to table the MBSS until a later day. 6/18/20:  Plan to wait on MBSS at the completion of radiation due to his scheduled time for radiation daily at the time that OP MBS's are typically scheduled.   -        SLP Time Calculation    SLP Goal Re-Cert Due Date  08/06/20  -       User Key  (r) = Recorded By, (t) = Taken By, (c) = Cosigned By    Initials Name Provider Type    JOMAR Teri Christie MS CCC-SLP Speech and Language Pathologist          OP SLP Education     Row Name 07/07/20 0930       Education    Education Comments  Education for continuing exercises.   -      User Key  (r) = Recorded By, (t) = Taken By, (c) = Cosigned By    Initials Name Effective Dates    JOMAR Shahnaz, Teri HOPKINS MS CCC-SLP 06/22/15 -           OP SLP Assessment/Plan - 07/07/20 0930        SLP Assessment    Functional Problems  Swallowing   -    Impact on Function: Swallowing  Risk of aspiration;Risk of pneumonia   -    Clinical Impression: Swallowing  Mild:;dysphagia unspecified   -    Clinical Impression- Voice  Mild voice disorder   -    Clinical Impression- PVFM  Does not appear to present with PV   -    Functional Problems Comment  Pt reports eating soft foods at this time. No choking episodes currently.   -    Clinical Impression Comments  Pt nearing end of radiation tx and pt has had vocal fold injection resulting in improved voice. Fxnl swallow at this time.   -       SLP Plan    Plan Comments  Cont with Dysphagia tx per followup with call vs continuing tx and/or MBSS.    -      User Key  (r) = Recorded By, (t) = Taken By, (c) = Cosigned By    Initials Name Provider Type    JOMAR Teri Christie MS CCC-SLP Speech and Language Pathologist                Time Calculation:   SLP Start Time: 0930    Therapy Charges for Today     Code Description Service Date Service Provider Modifiers Qty    11213932541  ST TREATMENT SWALLOW 2 7/7/2020 Teri Christie MS CCC-SLP GN 1                   Teri Christie MS CCC-SLP  7/7/2020

## 2020-07-08 ENCOUNTER — DOCUMENTATION (OUTPATIENT)
Dept: NUTRITION | Facility: HOSPITAL | Age: 60
End: 2020-07-08

## 2020-07-08 ENCOUNTER — HOSPITAL ENCOUNTER (OUTPATIENT)
Dept: RADIATION ONCOLOGY | Facility: HOSPITAL | Age: 60
Discharge: HOME | End: 2020-07-08

## 2020-07-08 PROCEDURE — 77386: CPT | Performed by: RADIOLOGY

## 2020-07-08 NOTE — PROGRESS NOTES
"ONC Nutrition     Diagnosis:  Tonsil cancer  Surgery: Right tonsillectomy and neck dissection by Dr. 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  Deep margin was close, less than 1 mm, peripheral margins were negative.  No lymphovascular invasion was identified.  1 of 23 lymph nodes were positive for metastatic squamous cell carcinoma.  Radiation:  [place] course of radiotherapy to deliver 45 Gray over 5 weeks to the right neck and oropharynx, 50 Rodriguez simultaneous integrated boost the previous areas of tumor and close margin / patient has completed 21/23 treatments.  Chemotherapy not indicated     SLP Evaluation: Mild dysphagia and moderate-severe voice disorder / no diet   recommendation indicated     Weight 216.7 lbs /  16.5 lbs weight loss over course of treatment = 7% weight loss    Weight loss of 16.5 lbs, coupled with diminished oral food intake, patient is now identified with acute disease related malnutrition.    Patient is struggling with oral food intake and symptoms as he nears completion of treatment; he states that there have been several days over the past week that he \"just have not been able to eat\" or \"properly hydrate\".  He exhibits mucositis and details taste changes. He is dependent on oral nutritional supplements to provide the majority of his intake.      Suggestions were provided for types of foods that he might be able to consume, including blenderized foods, cream soups without lumps, non acidic beverages, fortified milk products and \"power packing\" ideas.  Provided numerous suggestions for flavor and nutrient enhancement of oral nutritional supplements; provided samples of Boost VHC (530 calories each) and instructions for ordering if he found the product acceptable.    Discussed what to expect in the two weeks following completion of treatment and encouraged him to contact RAD ONC support staff if he became unable to hydrate and nourish related to " worsening symptoms.    Will follow.

## 2020-07-09 ENCOUNTER — HOSPITAL ENCOUNTER (OUTPATIENT)
Dept: RADIATION ONCOLOGY | Facility: HOSPITAL | Age: 60
Discharge: HOME | End: 2020-07-09

## 2020-07-09 ENCOUNTER — APPOINTMENT (OUTPATIENT)
Dept: SPEECH THERAPY | Facility: HOSPITAL | Age: 60
End: 2020-07-09

## 2020-07-09 PROCEDURE — 77386: CPT | Performed by: RADIOLOGY

## 2020-07-09 PROCEDURE — 77336 RADIATION PHYSICS CONSULT: CPT | Performed by: RADIOLOGY

## 2020-07-28 ENCOUNTER — DOCUMENTATION (OUTPATIENT)
Dept: RADIATION ONCOLOGY | Facility: HOSPITAL | Age: 60
End: 2020-07-28

## 2020-07-28 NOTE — RADIATION COMPLETION NOTES
RADIATION ONCOLOGY COMPLETION NOTE    PATIENT:   Corky Wan  MEDICAL RECORD:  [id number]  :    1960  COMPLETION DATE:  2020  DIAGNOSIS:   Oropharyngeal cancer  Stage I (cT1, cN1, cM0, p16+)      BRIEF HISTORY:  This 60 y.o. patient completed radiotherapy.  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  He is status post removal of all gross disease with right tonsillectomy and neck dissection.  Adjuvant radiotherapy was indicated for close surgical margin.      TREATMENT COURSE:  The previously involved right upper neck and right tonsillar regions received a minimum dose of 46 Gray in 23 daily fractions of 2 Gray using 6 MV photons with helical Jalil therapy IMRT technique.  Concurrently, using simultaneous integrated boost technique, the entire right neck received a minimum dose of 41.4 Gray.    DATES OF TREATMENT: 2020 through 2020    TOLERANCE:   typical for treatment site and no unexpected difficulties.  He experienced typical, expected symptoms of oral mucositis, odynophagia, dysphagia to solid foods, xerostomia, loss of taste, fatigue and mild skin erythema.  Over the entire course he lost 14 pounds.  Postoperative hoarseness initially improved significantly after vocal cord injection procedure during the early part of his radiotherapy treatment course, but strength of voice has diminished moderately towards the end of his treatment.    STATUS:  no evidence of disease recurrence    DISPOSITION:  Follow up in Radiation Oncology in approximately 1 month.        Justice Steve MD

## 2020-07-28 NOTE — PROGRESS NOTES
Telephone conversation with patient today.    After completing his radiotherapy course he experienced persistent, worsening odynophagia, dysphagia and fatigue which lasted approximately 2 weeks.  His weight decreased to a minimum of 207 pounds on liquid diet.  All of his symptoms now appear to be significantly improving/resolving.  He is no longer taking pain medication or using Magic mouthwash.  He does continue to use salt and soda rinses.  Hoarseness of voice has not recovered and in fact, has deteriorated.  He will soon follow-up with Dr. Singh to be evaluated for additional vocal cord injection procedures.    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  Additionally, his overall physical strength and strength of his immune system will likely be continuing to improve over the next couple months and he should avoid exposure to higher risk environments during this COVID epidemic.  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

## 2020-08-07 ENCOUNTER — DOCUMENTATION (OUTPATIENT)
Dept: SPEECH THERAPY | Facility: HOSPITAL | Age: 60
End: 2020-08-07

## 2020-08-07 DIAGNOSIS — R13.10 DYSPHAGIA, UNSPECIFIED TYPE: Primary | ICD-10-CM

## 2020-08-07 NOTE — THERAPY DISCHARGE NOTE
"Speech Language Pathology Discharge Summary         Patient Name: Corky Wan  : 1960  MRN: [id number]    Today's Date: 2020      SLP OP Goals     Row Name 20 1300          Goal Type Needed    Goal Type Needed  Dysphagia;Voice;Other Adult Goals  -HG        Subjective Comments    Subjective Comments  Pt seen for evaluation and treatment x 2.  Pt reports via phone follow ups that he is functional with swallowing at this time.   -HG        Dysphagia Goals    Patient will safely consume the recommended diet without complications such as aspiration pneumonia  regular/thin   -HG     Status: Patient will safely consume the recommended diet without complications such as aspiration pneumonia  Progressing as expected  -HG     Comments: Patient will safely consume the recommended diet without complications such as aspiration pneumonia  20: Most calories are coming from liquids (70-75%). Soft foods mostly due to \"more palatable\" Pt states that meat is difficult other than fish. Pt states that powerade helps with taste and improves taste vs water with meals.   20: Pt is salting food that he has never salted before. Pt is eating all solids with no difficulties.   -HG     Patient will increase laryngeal elevation to reduce residue that might fall into airway by completing  Mendelsohn maneuver;super-supraglottic swallow;falsetto/pitch glide;with cues  -HG     Status: Patient will increase laryngeal elevation to reduce residue that might fall into airway by completing  Progressing as expected  -HG     Comments: Patient will increase laryngeal elevation to reduce residue that might fall into airway by completing  : Pt feels that his neck is more pliable with these exercises and use of PT. 20:  All exercises completed with accuracy except for falsetto/pitch which is difficult to obtain with healing vocal fold.   -HG     Patient will increase closure of larynx to keep food from falling into the " airway by completing  super-supraglottic swallow;with cues  -HG     Status: Patient will increase closure of larynx to keep food from falling into the airway by completing  Progressing as expected  -HG     Comments: Patient will increase closure of larynx to keep food from falling into the airway by completing  7/7/20: Pt reports no difficulty. 6/18/20: 100% accurate.   -HG     Patient will increase strength of tongue base and posterior pharyngeal walls to reduce residue that might fall into airway by completing  effotful swallow;Glory (tongue hold);with cues  -HG     Status: Patient will increase strength of tongue base and posterior pharyngeal walls to reduce residue that might fall into airway by completing  Progressing as expected  -HG     Comments: Patient will increase strength of tongue base and posterior pharyngeal walls to reduce residue that might fall into airway by completing  7/7/20: Pt reports no difficulty. 6/18/20: Effortful and Glory: 100% accurate.   -HG     Patient will improve hyolaryngeal elevation via completing Marv (head lift)  sustained lift (comment #/duration of lifts);repetitive lift (comment #/duration of lifts);with cues 30 secs and 30 reps  -HG     Status: Patient will improve hyolaryngeal elevation via completing Marv (head lift)  Progressing as expected  -HG     Comments: Patient will improve hyolaryngeal elevation via completing Marv (head lift)  7/720: sustained lift is helping with ROM and non tightening of the neck tissue. 6/18/20: CTAR completed at times in place of the sustained and repetitive lift.   -HG        Voice Goals    Patient will be able to engage in speech at the conversational level in all settings, using functional phonation, acceptable habitual pitch and balanced tone focus.  90%:;with intermittent cues  -HG     Status: Patient will be able to engage in speech at the conversational level in all settings, using functional phonation, acceptable habitual  pitch and balanced tone focus.  New  -HG     Patient will reduce hyperfunctional use of the vocal mechanism via laryngeal massage and/or other relaxation techniques for the external muscles of the neck, shoulders and chest  80%  -HG     Status: Patient will reduce hyperfunctional use of the vocal mechanism via laryngeal massage and/or other relaxation techniques for the external muscles of the neck, shoulders and chest  Progressing as expected  -HG     Comments: Patient will reduce hyperfunctional use of the vocal mechanism via laryngeal massage and/or other relaxation techniques for the external muscles of the neck, shoulders and chest  6/18/20: Pt reports that he has been completing and focusing on breathing exercises.   -HG     Patient will be able to use a balanced vocal resonance  80%:;with cues  -HG     Status: Patient will be able to use a balanced vocal resonance  Progressing as expected  -HG     Comments: Patient will be able to use a balanced vocal resonance  7/7/20: pt reports much improved vocal quality with follow up planned for ENT.   -HG     Patient will be able to use functional phonation  80%:;with cues  -HG     Status: Patient will be able to use functional phonation  Progressing as expected  -HG     Comments: Patient will be able to use functional phonation  7/7/20: sustained /ah/: 20.20, 17.912, 20.33 secs.   6/18/20: sustained /ah/:  17.32, 13.61, 11.98.   -HG        Other Goals    Other Adult Goal- 1  Pt will participate in MBSS in order to further assess swallow fxn with recs to follow as indicated.  -HG     Status: Other Adult Goal- 1  New;Progressing as expected  -HG     Comments: Other Adult Goal- 1  7/7/20: Pt reports wanting to table the MBSS until a later day. 6/18/20: Plan to wait on MBSS at the completion of radiation due to his scheduled time for radiation daily at the time that OP MBS's are typically scheduled.   -HG        SLP Time Calculation    SLP Goal Re-Cert Due Date  08/06/20   -       User Key  (r) = Recorded By, (t) = Taken By, (c) = Cosigned By    Initials Name Provider Type     Teri Christie MS CCC-SLP Speech and Language Pathologist                 Time Calculation:                    Teri Christie MS CCC-SLP  8/7/2020

## 2020-08-12 ENCOUNTER — HOSPITAL ENCOUNTER (OUTPATIENT)
Dept: RADIATION ONCOLOGY | Facility: HOSPITAL | Age: 60
Setting detail: RADIATION/ONCOLOGY SERIES
Discharge: HOME | End: 2020-08-12

## 2020-08-12 ENCOUNTER — OFFICE VISIT (OUTPATIENT)
Dept: RADIATION ONCOLOGY | Facility: HOSPITAL | Age: 60
End: 2020-08-12

## 2020-08-12 VITALS
WEIGHT: 211.7 LBS | SYSTOLIC BLOOD PRESSURE: 140 MMHG | HEIGHT: 78 IN | RESPIRATION RATE: 16 BRPM | TEMPERATURE: 97.2 F | DIASTOLIC BLOOD PRESSURE: 80 MMHG | HEART RATE: 81 BPM | OXYGEN SATURATION: 98 % | BODY MASS INDEX: 24.49 KG/M2

## 2020-08-12 DIAGNOSIS — C09.9 TONSIL CANCER (HCC): ICD-10-CM

## 2020-08-12 PROCEDURE — G0463 HOSPITAL OUTPT CLINIC VISIT: HCPCS

## 2020-08-12 NOTE — PROGRESS NOTES
FOLLOW UP NOTE    PATIENT:                                                      Corky Wan  MEDICAL RECORD #:                        [id number]  :                                                          1960  COMPLETION DATE:   2020  DIAGNOSIS:     Tonsil cancer (CMS/HCC)  - Stage I (cT1, cN1, cM0, p16+)      BRIEF HISTORY:    Initial follow-up visit.  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  He underwent right tonsillectomy and right neck dissection with removal of all gross disease.  Adjuvant therapy was indicated for close surgical margins, and he received a dose of 46 Gy in 23 fractions to the involved right upper neck and right tonsillar regions.  He tolerated treatment well and expected symptoms/toxicities continue to tres.  He developed grade 1 fatigue, dysphasia, odynophagia, posterior mouth sores, dysgeusia, and weight loss which have all vastly improved over the past few weeks.  He is now tolerating a wide range of solids and liquids and taste is returning.  He no longer requires Magic mouthwash.  He continues with salt and soda rinses.  He is maintaining a steady weight after losing ~30 pounds with treatment.  He is beginning to reincorporate physical activity into his routine and continues to be conscientious about food choices to support weight and nutrition.  His chief concern is regarding his hoarseness and vocal fatigue related to a paralyzed right true vocal cord.  He has now had 2 gel injections per Dr. Singh with great improvement.  He continues PT for right shoulder mobility.  He has good range of motion of his right neck.  No dermatitis or skin changes.  He denies pain, shortness of air, or other acute complaints today.          MEDICATIONS: Medication reconciliation for the patient was reviewed and confirmed in the electronic medical record.    Review of Systems   Constitutional: Positive for fatigue.   HENT:    Positive for sore throat (mild, improving) and voice change. Negative for mouth sores and trouble swallowing.         Dysgeusia   Musculoskeletal: Positive for neck stiffness.   Neurological: Positive for numbness (right neck, ear).   All other systems reviewed and are negative.      KPS 90%    Physical Exam   Constitutional: He is oriented to person, place, and time. He appears well-developed and well-nourished. No distress.   HENT:   Head: Normocephalic and atraumatic.   Mouth/Throat: Oropharynx is clear and moist and mucous membranes are normal. No oral lesions.   Empty right tonsillar fossa without lesions or exudate.  Vocal quality clear, strong.   Eyes: Pupils are equal, round, and reactive to light. Conjunctivae and EOM are normal.   Neck: Normal range of motion and full passive range of motion without pain. Neck supple. No neck rigidity. Normal range of motion present.   Right neck subcutaneous fibrosis consistent with surgical and radiation change.  Right neck surgical incision well-healed.  No adenopathy or palpable mass.   Cardiovascular: Normal rate and regular rhythm. Exam reveals no friction rub.   No murmur heard.  Pulmonary/Chest: Effort normal and breath sounds normal. He has no wheezes.   Abdominal: Soft. Bowel sounds are normal. He exhibits no distension and no mass. There is no tenderness.   Musculoskeletal: Normal range of motion. He exhibits no edema or tenderness.   Good ROM to flexion, extension, and rotation of neck   Lymphadenopathy:     He has no cervical adenopathy.   Neurological: He is alert and oriented to person, place, and time.   Skin: Skin is warm and dry. No erythema.   Psychiatric: He has a normal mood and affect. His behavior is normal. Judgment and thought content normal.   Nursing note and vitals reviewed.      VITAL SIGNS:   Vitals:    08/12/20 1033   BP: 140/80   Pulse: 81   Resp: 16   Temp: 97.2 °F (36.2 °C)   TempSrc: Temporal   SpO2: 98%  Comment: RA   Weight: 96 kg (211  "lb 11.2 oz)   Height: 200.7 cm (79.02\")   PainSc:   4   PainLoc: Neck  Comment: throat pain       The following portions of the patient's history were reviewed and updated as appropriate: allergies, current medications, past family history, past medical history, past social history, past surgical history and problem list.         Corky was seen today for tonsil cancer.    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  Following right tonsillectomy and neck dissection, he underwent adjuvant radiotherapy which he completed 1 month ago.  He tolerated treatment well.  Residual radiation-related toxicities continue to tres.  Clinically, he is without evidence of persistent or recurrent disease.  He is scheduled to undergo restaging PET/CT with Dr. Singh in October to determine treatment response.  Prognosis should remain excellent.  He may require further injections for dysphonia related to right vocal cord paralysis, which he will arrange with his ENT.  We discussed follow-up intervals and surveillance imaging.      RECOMMENDATIONS: Mr. Wan continues under the care of his ENT, Dr. Singh, with repeat PET/CT and follow-up in October.  We will therefore schedule him to return to our clinic 3 months afterwards for follow-up and exam.    Return in about 5 months (around 1/11/2021) for Office Visit.    URIEL Yeung      "

## 2020-10-23 ENCOUNTER — DOCUMENTATION (OUTPATIENT)
Dept: RADIATION ONCOLOGY | Facility: HOSPITAL | Age: 60
End: 2020-10-23

## 2020-10-23 DIAGNOSIS — C09.9 TONSIL CANCER (HCC): Primary | ICD-10-CM

## 2020-10-23 NOTE — PROGRESS NOTES
Telephone conversation with patient.  He is clinically doing well.  Voice is stronger.  He is not requiring additional vocal cord injections.  Swallowing is good.  He saw his ENT physician and was given a good report.  His ENT physician request that I order his surveillance follow-up PET/CT.  I am happy to do so.  The PET CT should be performed approximately 4 months after treatment, middle of next month.  I will move up his scheduled follow-up with me in January 2021 to occur a couple days after the PET scan in mid November 2020.

## 2020-11-16 ENCOUNTER — HOSPITAL ENCOUNTER (OUTPATIENT)
Dept: PET IMAGING | Facility: HOSPITAL | Age: 60
Discharge: HOME | End: 2020-11-16

## 2020-11-16 DIAGNOSIS — C09.9 TONSIL CANCER (HCC): ICD-10-CM

## 2020-11-16 LAB — GLUCOSE BLDC GLUCOMTR-MCNC: 94 MG/DL (ref 70–130)

## 2020-11-16 PROCEDURE — 0 FLUDEOXYGLUCOSE F18 SOLUTION: Performed by: RADIOLOGY

## 2020-11-16 PROCEDURE — 78815 PET IMAGE W/CT SKULL-THIGH: CPT

## 2020-11-16 PROCEDURE — 82962 GLUCOSE BLOOD TEST: CPT

## 2020-11-16 PROCEDURE — A9552 F18 FDG: HCPCS | Performed by: RADIOLOGY

## 2020-11-16 RX ADMIN — FLUDEOXYGLUCOSE F18 1 DOSE: 300 INJECTION INTRAVENOUS at 08:20

## 2020-11-18 ENCOUNTER — APPOINTMENT (OUTPATIENT)
Dept: PET IMAGING | Facility: HOSPITAL | Age: 60
End: 2020-11-18

## 2020-11-20 ENCOUNTER — OFFICE VISIT (OUTPATIENT)
Dept: RADIATION ONCOLOGY | Facility: HOSPITAL | Age: 60
End: 2020-11-20

## 2020-11-20 ENCOUNTER — HOSPITAL ENCOUNTER (OUTPATIENT)
Dept: RADIATION ONCOLOGY | Facility: HOSPITAL | Age: 60
Setting detail: RADIATION/ONCOLOGY SERIES
Discharge: HOME | End: 2020-11-20

## 2020-11-20 VITALS
RESPIRATION RATE: 18 BRPM | HEIGHT: 78 IN | BODY MASS INDEX: 25.36 KG/M2 | DIASTOLIC BLOOD PRESSURE: 67 MMHG | HEART RATE: 55 BPM | SYSTOLIC BLOOD PRESSURE: 155 MMHG | WEIGHT: 219.2 LBS | OXYGEN SATURATION: 96 % | TEMPERATURE: 97.8 F

## 2020-11-20 DIAGNOSIS — C09.9 TONSIL CANCER (HCC): Primary | ICD-10-CM

## 2020-11-20 PROCEDURE — G0463 HOSPITAL OUTPT CLINIC VISIT: HCPCS

## 2020-11-20 RX ORDER — NEBIVOLOL 10 MG/1
10 TABLET ORAL DAILY
COMMUNITY

## 2020-11-20 NOTE — PROGRESS NOTES
FOLLOW UP NOTE    PATIENT:                                                      Corky Wan  MEDICAL RECORD #:                        [id number]  :                                                          1960  COMPLETION DATE:   2020  DIAGNOSIS:     Tonsil cancer (CMS/HCC)  - Stage I (cT1, cN1, cM0, p16+)      BRIEF HISTORY:    Routine follow-up visit.  He status post adjuvant radiotherapy to the right tonsil and neck.  Most symptoms have significantly improved.  He has some residual xerostomia.  Swallowing is normal.  Voice has improved considerably and he is not requiring additional vocal cord injections.  He exercises regularly and feels well in general.  He is being followed by Dr. Fuentes and has been given good reports with no evidence of recurrent neoplasm.  Recent PET/CT shows mild low-level hypermetabolism, SUV 3.2, in the right tonsillar fossa without associated mass, consistent with postsurgical postradiation healing.  The previous noted hypermetabolism and mass in the right neck are no longer present.  The asymmetric hypermetabolism associated with the vocal cord paralysis has significantly improved.  There is no evidence of suspicious disease elsewhere in the body.  His blood pressure medication is being adjusted due to low sodium however this is improving since being switched to beta-blocker and with some moderate fluid restrictions.      MEDICATIONS: Medication reconciliation for the patient was reviewed and confirmed in the electronic medical record.    Review of Systems   Neurological: Positive for light-headedness.   All other systems reviewed and are negative.      KPS 90%    Physical Exam  Vitals signs and nursing note reviewed.   Constitutional:       Appearance: He is well-developed.   HENT:      Head:      Comments: Well-healed right neck incision.  No associated mass.  Mild volume loss.     Mouth/Throat:      Mouth: Mucous membranes are moist.      Pharynx: Oropharynx is  "clear. No oropharyngeal exudate or posterior oropharyngeal erythema.   Neck:      Musculoskeletal: Normal range of motion and neck supple.   Cardiovascular:      Rate and Rhythm: Normal rate and regular rhythm.      Heart sounds: Normal heart sounds. No murmur.   Pulmonary:      Effort: Pulmonary effort is normal.      Breath sounds: Normal breath sounds. No wheezing or rales.   Abdominal:      General: Bowel sounds are normal. There is no distension.      Palpations: Abdomen is soft.      Tenderness: There is no abdominal tenderness.   Musculoskeletal: Normal range of motion.         General: No tenderness.   Lymphadenopathy:      Cervical: No cervical adenopathy.      Upper Body:      Right upper body: No supraclavicular adenopathy.      Left upper body: No supraclavicular adenopathy.   Skin:     General: Skin is warm and dry.   Neurological:      Mental Status: He is alert and oriented to person, place, and time.      Sensory: No sensory deficit.   Psychiatric:         Behavior: Behavior normal.         Thought Content: Thought content normal.         Judgment: Judgment normal.         VITAL SIGNS:   Vitals:    11/20/20 0900   BP: 155/67   Pulse: 55   Resp: 18   Temp: 97.8 °F (36.6 °C)   TempSrc: Temporal   SpO2: 96%  Comment: Ra   Weight: 99.4 kg (219 lb 3.2 oz)   Height: 200.7 cm (79\")   PainSc: 0-No pain       The following portions of the patient's history were reviewed and updated as appropriate: allergies, current medications, past family history, past medical history, past social history, past surgical history and problem list.         Diagnoses and all orders for this visit:    1. 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  4 months status post adjuvant radiotherapy.  Treatment related symptoms have significantly improved and xerostomia should continue to improve as well.  He has no " evidence of persistent or recurrent neoplasm.  I do not believe the tumor, surgery or radiotherapy should have any current effect on his hyponatremia.    RECOMMENDATIONS: He plans to continue ENT surveillance under the care of Dr. Fuentes.  Continue medical management of his hypertension and hyponatremia through his primary care physician.  I will be happy to be available, if needed.    Return in about 1 year (around 11/20/2021) for Office Visit.    Justice Steve MD

## 2021-02-24 ENCOUNTER — IMMUNIZATION (OUTPATIENT)
Dept: VACCINE CLINIC | Facility: HOSPITAL | Age: 61
End: 2021-02-24

## 2021-02-24 PROCEDURE — 91300 HC SARSCOV02 VAC 30MCG/0.3ML IM: CPT | Performed by: INTERNAL MEDICINE

## 2021-02-24 PROCEDURE — 0001A: CPT | Performed by: INTERNAL MEDICINE

## 2021-03-17 ENCOUNTER — IMMUNIZATION (OUTPATIENT)
Dept: VACCINE CLINIC | Facility: HOSPITAL | Age: 61
End: 2021-03-17

## 2021-03-17 PROCEDURE — 0002A: CPT | Performed by: INTERNAL MEDICINE

## 2021-03-17 PROCEDURE — 91300 HC SARSCOV02 VAC 30MCG/0.3ML IM: CPT | Performed by: INTERNAL MEDICINE

## 2025-06-09 NOTE — ANESTHESIA POSTPROCEDURE EVALUATION
Patient: Corky Wan    Procedure Summary       Date: 04/30/20 Room / Location:  [facility]    Anesthesia Start: 0729 Anesthesia Stop: 1207    Procedures:       MODIFIED NECK DISSECTION RIGHT (Right: Neck)      TONISLLECTOMY RIGHT (Right: Throat)      LARYNGOSCOPY (Throat)      ESOPHAGOSCOPY (Esophagus) Diagnosis:     Surgeons: Andrews Singh MD Provider: Andrews Bliss MD    Anesthesia Type: general ASA Status: 3            Anesthesia Type: general    Vitals  Vitals Value Taken Time   /67 04/30/20 13:00   Temp 97.9 °F (36.6 °C) 04/30/20 12:45   Pulse 72 04/30/20 13:00   Resp 16 04/30/20 13:00   SpO2 100 % 04/30/20 13:00           Post Anesthesia Care and Evaluation    Patient location during evaluation: PACU  Patient participation: complete - patient participated  Level of consciousness: awake and alert  Pain management: adequate    Airway patency: patent  Anesthetic complications: No anesthetic complications  PONV Status: none  Cardiovascular status: hemodynamically stable and acceptable  Respiratory status: nonlabored ventilation, acceptable and nasal cannula  Hydration status: acceptable

## (undated) DEVICE — DEFOGGER!" ANTI FOG KIT: Brand: DEROYAL

## (undated) DEVICE — SYR LUERLOK 20CC BX/50

## (undated) DEVICE — PK MAJ ENT 10

## (undated) DEVICE — ENT MINOR: Brand: MEDLINE INDUSTRIES, INC.

## (undated) DEVICE — CVR HNDL LIGHT RIGID

## (undated) DEVICE — COAGULATOR SXN HNDSWITCH 12F 3M

## (undated) DEVICE — RETR STAY HK ELAS SHRP 5MM 50PK

## (undated) DEVICE — TOWEL,OR,DSP,ST,NATURAL,DLX,4/PK,20PK/CS: Brand: MEDLINE

## (undated) DEVICE — TUBING, SUCTION, 1/4" X 10', STRAIGHT: Brand: MEDLINE

## (undated) DEVICE — BLAKE SILICONE DRAIN, 19 FR ROUND, HUBLESS WITH 1/4" BENDABLE TROCAR: Brand: BLAKE

## (undated) DEVICE — BANDAGE,GAUZE,CONFORMING,4"X75",STRL,LF: Brand: MEDLINE

## (undated) DEVICE — TONSIL SPONGES: Brand: DEROYAL

## (undated) DEVICE — Device

## (undated) DEVICE — SUT GUT CHRM 3/0 LIGAPAK 54IN L112G

## (undated) DEVICE — MOUTHGUARD FORM FIT WO/STRAP TRIM

## (undated) DEVICE — JACKSON-PRATT 100CC BULB RESERVOIR: Brand: CARDINAL HEALTH

## (undated) DEVICE — INTENDED USE FOR SURGICAL MARKING ON INTACT SKIN, ALSO PROVIDES A PERMANENT METHOD OF IDENTIFYING OBJECTS IN THE OPERATING ROOM: Brand: WRITESITE® REGULAR TIP SKIN MARKER

## (undated) DEVICE — PENCL E/S HNDSWCH ROCKRBTN HOLSTR 10FT

## (undated) DEVICE — SUT SILK 2/0 TIES 18IN A185H

## (undated) DEVICE — SUT GUT PLN FAST ABS 5/0 PC1 18IN 1915G

## (undated) DEVICE — SPK TRANSFR TRANSOFIX DBL STRL

## (undated) DEVICE — DISH PETRI 3.5IN MD STRL LF

## (undated) DEVICE — SUT GUT CHRM 3/0 PS2 27IN 1638H

## (undated) DEVICE — NASOGASTRIC FEEDING TUBE,5 G WEIGHTED TIP, RIGID PORT, STYLET: Brand: KANGAROO

## (undated) DEVICE — SYR LL TP 10ML STRL

## (undated) DEVICE — CATHETER,URETHRAL,REDRUBBER,STRL,12FR: Brand: MEDLINE INDUSTRIES, INC.

## (undated) DEVICE — NEURO SPONGES: Brand: DEROYAL

## (undated) DEVICE — SUT GUT CHRM 3/0 SH 27IN G122H

## (undated) DEVICE — SUT SILK 3/0 TIES 18IN A184H

## (undated) DEVICE — SUT SILK 2/0 PS 18IN 1588H

## (undated) DEVICE — PAD GRND REM POLYHESIVE A/ DISP